# Patient Record
Sex: FEMALE | Race: BLACK OR AFRICAN AMERICAN | NOT HISPANIC OR LATINO | URBAN - METROPOLITAN AREA
[De-identification: names, ages, dates, MRNs, and addresses within clinical notes are randomized per-mention and may not be internally consistent; named-entity substitution may affect disease eponyms.]

---

## 2021-01-13 ENCOUNTER — IMMUNIZATION (OUTPATIENT)
Dept: INTERNAL MEDICINE | Facility: CLINIC | Age: 81
End: 2021-01-13
Payer: OTHER GOVERNMENT

## 2021-01-13 DIAGNOSIS — Z23 NEED FOR VACCINATION: ICD-10-CM

## 2021-01-13 PROCEDURE — 91300 COVID-19, MRNA, LNP-S, PF, 30 MCG/0.3 ML DOSE VACCINE: CPT | Mod: PBBFAC

## 2021-02-03 ENCOUNTER — IMMUNIZATION (OUTPATIENT)
Dept: INTERNAL MEDICINE | Facility: CLINIC | Age: 81
End: 2021-02-03
Payer: MEDICARE

## 2021-02-03 DIAGNOSIS — Z23 NEED FOR VACCINATION: Primary | ICD-10-CM

## 2021-02-03 PROCEDURE — 91300 COVID-19, MRNA, LNP-S, PF, 30 MCG/0.3 ML DOSE VACCINE: CPT | Mod: PBBFAC | Performed by: INTERNAL MEDICINE

## 2021-02-03 PROCEDURE — 0002A COVID-19, MRNA, LNP-S, PF, 30 MCG/0.3 ML DOSE VACCINE: CPT | Mod: PBBFAC | Performed by: INTERNAL MEDICINE

## 2021-09-30 ENCOUNTER — IMMUNIZATION (OUTPATIENT)
Dept: INTERNAL MEDICINE | Facility: CLINIC | Age: 81
End: 2021-09-30
Payer: MEDICARE

## 2021-09-30 DIAGNOSIS — Z23 NEED FOR VACCINATION: Primary | ICD-10-CM

## 2021-09-30 PROCEDURE — 91300 COVID-19, MRNA, LNP-S, PF, 30 MCG/0.3 ML DOSE VACCINE: CPT | Mod: PBBFAC

## 2021-09-30 PROCEDURE — 0003A COVID-19, MRNA, LNP-S, PF, 30 MCG/0.3 ML DOSE VACCINE: CPT | Mod: CV19,PBBFAC | Performed by: INTERNAL MEDICINE

## 2025-01-01 ENCOUNTER — OFFICE VISIT (OUTPATIENT)
Dept: URGENT CARE | Facility: CLINIC | Age: 85
End: 2025-01-01
Payer: MEDICARE

## 2025-01-01 VITALS
TEMPERATURE: 98 F | OXYGEN SATURATION: 97 % | WEIGHT: 180.75 LBS | BODY MASS INDEX: 27.39 KG/M2 | HEART RATE: 91 BPM | HEIGHT: 68 IN | DIASTOLIC BLOOD PRESSURE: 72 MMHG | RESPIRATION RATE: 20 BRPM | SYSTOLIC BLOOD PRESSURE: 178 MMHG

## 2025-01-01 DIAGNOSIS — M25.531 RIGHT WRIST PAIN: ICD-10-CM
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
## 2025-01-01 DIAGNOSIS — S52.571A OTHER CLOSED INTRA-ARTICULAR FRACTURE OF DISTAL END OF RIGHT RADIUS, INITIAL ENCOUNTER: Primary | ICD-10-CM

## 2025-01-01 DIAGNOSIS — M79.641 RIGHT HAND PAIN: ICD-10-CM

## 2025-01-01 DIAGNOSIS — S09.90XA HEAD TRAUMA, INITIAL ENCOUNTER: ICD-10-CM

## 2025-01-01 DIAGNOSIS — S52.601A CLOSED FRACTURE OF DISTAL END OF RIGHT ULNA, UNSPECIFIED FRACTURE MORPHOLOGY, INITIAL ENCOUNTER: ICD-10-CM

## 2025-01-01 PROCEDURE — 73110 X-RAY EXAM OF WRIST: CPT | Mod: RT,S$GLB,, | Performed by: RADIOLOGY

## 2025-01-01 PROCEDURE — 73130 X-RAY EXAM OF HAND: CPT | Mod: RT,S$GLB,, | Performed by: RADIOLOGY

## 2025-01-01 RX ORDER — OLMESARTAN MEDOXOMIL 40 MG/1
40 TABLET ORAL
COMMUNITY
Start: 2024-12-09

## 2025-01-01 RX ORDER — TRAMADOL HYDROCHLORIDE 50 MG/1
50 TABLET ORAL EVERY 8 HOURS PRN
Qty: 15 EACH | Refills: 0 | Status: SHIPPED | OUTPATIENT
Start: 2025-01-01 | End: 2025-01-02 | Stop reason: ALTCHOICE

## 2025-01-01 RX ORDER — LORAZEPAM 1 MG/1
1 TABLET ORAL 2 TIMES DAILY PRN
COMMUNITY
Start: 2024-10-03

## 2025-01-01 RX ORDER — ESCITALOPRAM OXALATE 10 MG/1
10 TABLET ORAL
COMMUNITY
Start: 2024-08-29

## 2025-01-02 ENCOUNTER — OFFICE VISIT (OUTPATIENT)
Dept: ORTHOPEDICS | Facility: CLINIC | Age: 85
End: 2025-01-02
Payer: MEDICARE

## 2025-01-02 ENCOUNTER — TELEPHONE (OUTPATIENT)
Dept: ORTHOPEDICS | Facility: CLINIC | Age: 85
End: 2025-01-02
Payer: MEDICARE

## 2025-01-02 ENCOUNTER — HOSPITAL ENCOUNTER (OUTPATIENT)
Dept: RADIOLOGY | Facility: OTHER | Age: 85
Discharge: HOME | End: 2025-01-02
Attending: ORTHOPAEDIC SURGERY
Payer: MEDICARE

## 2025-01-02 DIAGNOSIS — S62.101A CLOSED FRACTURE OF RIGHT WRIST, INITIAL ENCOUNTER: Primary | ICD-10-CM

## 2025-01-02 DIAGNOSIS — S52.501A CLOSED FRACTURE OF DISTAL END OF RIGHT RADIUS, UNSPECIFIED FRACTURE MORPHOLOGY, INITIAL ENCOUNTER: Primary | ICD-10-CM

## 2025-01-02 DIAGNOSIS — S62.101A CLOSED FRACTURE OF RIGHT WRIST, INITIAL ENCOUNTER: ICD-10-CM

## 2025-01-02 PROCEDURE — 1159F MED LIST DOCD IN RCRD: CPT | Mod: CPTII,S$GLB,, | Performed by: SPECIALIST/TECHNOLOGIST

## 2025-01-02 PROCEDURE — 1101F PT FALLS ASSESS-DOCD LE1/YR: CPT | Mod: CPTII,S$GLB,, | Performed by: SPECIALIST/TECHNOLOGIST

## 2025-01-02 PROCEDURE — 73110 X-RAY EXAM OF WRIST: CPT | Mod: 26,RT,, | Performed by: RADIOLOGY

## 2025-01-02 PROCEDURE — 99999 PR PBB SHADOW E&M-EST. PATIENT-LVL III: CPT | Mod: PBBFAC,,, | Performed by: SPECIALIST/TECHNOLOGIST

## 2025-01-02 PROCEDURE — 1125F AMNT PAIN NOTED PAIN PRSNT: CPT | Mod: CPTII,S$GLB,, | Performed by: SPECIALIST/TECHNOLOGIST

## 2025-01-02 PROCEDURE — 73110 X-RAY EXAM OF WRIST: CPT | Mod: TC,FY,RT

## 2025-01-02 PROCEDURE — 99204 OFFICE O/P NEW MOD 45 MIN: CPT | Mod: S$GLB,,, | Performed by: SPECIALIST/TECHNOLOGIST

## 2025-01-02 PROCEDURE — 3288F FALL RISK ASSESSMENT DOCD: CPT | Mod: CPTII,S$GLB,, | Performed by: SPECIALIST/TECHNOLOGIST

## 2025-01-02 RX ORDER — ONDANSETRON 4 MG/1
4 TABLET, FILM COATED ORAL EVERY 8 HOURS PRN
Qty: 10 TABLET | Refills: 0 | Status: SHIPPED | OUTPATIENT
Start: 2025-01-02

## 2025-01-02 RX ORDER — ACETAMINOPHEN 500 MG
1000 TABLET ORAL 2 TIMES DAILY PRN
Qty: 50 TABLET | Refills: 0 | Status: SHIPPED | OUTPATIENT
Start: 2025-01-02

## 2025-01-02 RX ORDER — OXYCODONE AND ACETAMINOPHEN 5; 325 MG/1; MG/1
1 TABLET ORAL
Qty: 10 TABLET | Refills: 0 | Status: SHIPPED | OUTPATIENT
Start: 2025-01-02

## 2025-01-02 RX ORDER — IBUPROFEN 600 MG/1
600 TABLET ORAL 3 TIMES DAILY PRN
Qty: 45 TABLET | Refills: 0 | Status: SHIPPED | OUTPATIENT
Start: 2025-01-02

## 2025-01-02 NOTE — PROGRESS NOTES
"Subjective:      Patient ID: Morelia Swan is a 84 y.o. female.    Vitals:  height is 5' 8" (1.727 m) and weight is 82 kg (180 lb 12.4 oz). Her oral temperature is 98.3 °F (36.8 °C). Her blood pressure is 178/72 (abnormal) and her pulse is 91. Her respiration is 20 and oxygen saturation is 97%.     Chief Complaint: Fall    Pt is an 83 yo female who states she had a fall last night and tried to stop herself from falling injuring her right hand and her right wrist. She took tylenol for pain with relief. She applied some ice last night and was elevating it today with mild relief of swelling. Pt also reports swelling and bruising to the R side of her R eye, states she thinks her glasses hit the wall and caused the injury. Denies LOC, headache, vision changes, nausea, fatigue.     Fall  The accident occurred 12 to 24 hours ago. The fall occurred while standing. She fell from a height of 3 to 5 ft. She landed on Hard floor. There was no blood loss. The point of impact was the right wrist (right hand). The pain is present in the right wrist. The pain is at a severity of 9/10. The pain is moderate. The symptoms are aggravated by movement and use of injured limb. Pertinent negatives include no headaches or numbness. She has tried ice and acetaminophen for the symptoms. The treatment provided mild relief.       Musculoskeletal:  Positive for trauma, joint pain and joint swelling. Negative for abnormal ROM of joint.   Neurological:  Negative for dizziness, passing out, headaches, numbness and tingling.      Objective:     Physical Exam   Constitutional: She is oriented to person, place, and time.   HENT:   Head: Normocephalic. Head is with raccoon's eyes. Head is without abrasion.   Ears:   Right Ear: External ear normal.   Left Ear: External ear normal.   Nose: Nose normal.   Mouth/Throat: Mucous membranes are moist.   Eyes: Conjunctivae are normal.       Cardiovascular: Normal rate.   Pulmonary/Chest: Effort normal. "   Musculoskeletal:      Left wrist: She exhibits tenderness and swelling. She exhibits normal range of motion.      Left hand: She exhibits swelling. She exhibits normal range of motion, no tenderness and normal capillary refill.   Neurological: She is alert, oriented to person, place, and time and at baseline. She has normal motor skills, normal sensation and intact cranial nerves (2-12). She displays no weakness, no atrophy, no tremor and no dysarthria. No cranial nerve deficit. She has a normal Finger-Nose-Finger Test and a normal Tandem Gait Test. Coordination: Romberg sign negative and Rapid alternating movements normal. She displays no seizure activity. Gait and coordination normal. Coordination and gait normal.   Skin: Skin is dry.   Psychiatric: Her behavior is normal.   Nursing note and vitals reviewed.    XR WRIST COMPLETE 3 VIEWS RIGHT    Result Date: 1/1/2025  EXAMINATION: XR WRIST COMPLETE 3 VIEWS RIGHT CLINICAL HISTORY: Pain in right wrist TECHNIQUE: PA, lateral, and oblique views of the right wrist were performed. COMPARISON: None FINDINGS: An intra-articular compression fracture of the distal right radius is noted with mild volar angulation. There is also an incomplete fracture through the ulna styloid. An intra-articular component may also be present.  No fractures of the carpal bones are seen.  No fracture of the right metacarpals are noted..  There is soft tissue swelling and edematous change noted surrounding the right wrist.  There is also a joint effusion.     See above. This report was flagged in Epic as abnormal. Electronically signed by: Hailey Harkins Date:    01/01/2025 Time:    18:28    XR HAND COMPLETE 3 VIEW RIGHT    Result Date: 1/1/2025  EXAMINATION: XR HAND COMPLETE 3 VIEW RIGHT CLINICAL HISTORY: Pain in right hand TECHNIQUE: PA, lateral, and oblique views of the right hand were performed. COMPARISON: None FINDINGS: Compression fracture of the distal radius with an  intra-articular component probable.  There is a 2nd fracture fragment with minimal displacement of the medial edge of the distal radius.  Also suspect a nondisplaced transverse fracture through the distal ulna.  Surrounding soft tissue swelling is noted and edematous changes of the soft tissues.  No fracture of the right hand is seen.  A joint effusion at the wrist is noted.     See findings. Electronically signed by: Hailey Harkins Date:    01/01/2025 Time:    18:22     Assessment:     1. Other closed intra-articular fracture of distal end of right radius, initial encounter    2. Fall, initial encounter    3. Right hand pain    4. Right wrist pain    5. Closed fracture of distal end of right ulna, unspecified fracture morphology, initial encounter    6. Head trauma, initial encounter        Plan:       Other closed intra-articular fracture of distal end of right radius, initial encounter  -     Ambulatory referral/consult to Orthopedics  -     SUGAR TONG SPLINT FOR HOME USE  -     traMADoL (ULTRAM) 50 mg tablet; Take 1 tablet (50 mg total) by mouth every 8 (eight) hours as needed for Pain.  Dispense: 15 each; Refill: 0    Fall, initial encounter    Right hand pain  -     XR HAND COMPLETE 3 VIEW RIGHT; Future; Expected date: 01/01/2025  -     traMADoL (ULTRAM) 50 mg tablet; Take 1 tablet (50 mg total) by mouth every 8 (eight) hours as needed for Pain.  Dispense: 15 each; Refill: 0    Right wrist pain  -     XR WRIST COMPLETE 3 VIEWS RIGHT; Future; Expected date: 01/01/2025  -     traMADoL (ULTRAM) 50 mg tablet; Take 1 tablet (50 mg total) by mouth every 8 (eight) hours as needed for Pain.  Dispense: 15 each; Refill: 0    Closed fracture of distal end of right ulna, unspecified fracture morphology, initial encounter  -     Ambulatory referral/consult to Orthopedics  -     SUGAR TONG SPLINT FOR HOME USE  -     traMADoL (ULTRAM) 50 mg tablet; Take 1 tablet (50 mg total) by mouth every 8 (eight) hours as needed for  Pain.  Dispense: 15 each; Refill: 0    Head trauma, initial encounter  -     CT Head Without Contrast; Future; Expected date: 01/01/2025      Patient Instructions   - You must understand that you have received an Urgent Care treatment only and that you may be released before all of your medical problems are known or treated.   - You, the patient, will arrange for follow up care as instructed.   - If your condition worsens or fails to improve we recommend that you receive another evaluation at the ER immediately or contact your PCP to discuss your concerns.   - You can call [phone] or [phone] to help schedule an appointment with the appropriate provider.    Take OTC ibuprofen 400-800 mg 3 times per day. Max dose 3200 mg from all sources per day. Or Tylenol 500-1000 mg 3 times per day. Max 4000 mg from all sources per day.   Rest and elevate the affected area as much as possible  Apply ice for 15 minutes. Do this 3-4 times per day.

## 2025-01-02 NOTE — PATIENT INSTRUCTIONS
- You must understand that you have received an Urgent Care treatment only and that you may be released before all of your medical problems are known or treated.   - You, the patient, will arrange for follow up care as instructed.   - If your condition worsens or fails to improve we recommend that you receive another evaluation at the ER immediately or contact your PCP to discuss your concerns.   - You can call [phone] or [phone] to help schedule an appointment with the appropriate provider.    Take OTC ibuprofen 400-800 mg 3 times per day. Max dose 3200 mg from all sources per day. Or Tylenol 500-1000 mg 3 times per day. Max 4000 mg from all sources per day.   Rest and elevate the affected area as much as possible  Apply ice for 15 minutes. Do this 3-4 times per day.

## 2025-01-02 NOTE — H&P (VIEW-ONLY)
Patient ID: Morelia Swan is a 84 y.o. female.    Chief Complaint: Pain of the Right Wrist    History of Present Illness    CHIEF COMPLAINT:  - Morelia who is right-handed presents for initial evaluation of a right wrist fracture sustained from a fall.    HPI:  Morelia presents to the clinic following a fall. Her shoes stuck to the floor, causing her to fall and injure her right wrist. Morelia indicates the location of pain. After a cast was applied, she mentions her wrist was painful and itchy. She denies any previous injuries to her wrist and confirms full use of her hand prior to this incident. 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 She reports having blood pressure issues but denies any other health problems.    X-rays reveal a distal radius fracture of her right wrist, which is described as severe and misaligned. She expresses concern about her ability to care for her sick  at home and her ability to continue her volunteer work. She appears hesitant about surgery, worried about the impact on her caregiving responsibilities and her job.    She denies any numbness or tingling in the affected wrist, tenderness in the elbow, or previous wrist injuries.    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          Hand/Wrist Musculoskeletal Exam    Inspection    Right      Erythema: none      Ecchymosis: none      Edema: moderate      Deformity: none    Left      Erythema: none      Ecchymosis: none      Edema: none      Deformity: none    Palpation    Right      Wrist tenderness to palpation: radial styloid, radial carpal joint and ulnocarpal joint    Palpation additional comments: Denies any elbow pain.    Range of Motion        Range of motion  additional comments: Able to make a composite fist.     Neurovascular    Right       Radial pulse: normal      Capillary refill: brisk and <3 sec      Ulnar nerve sensory distribution: normal      Median nerve sensory distribution: normal      Superficial radial nerve sensory distribution: normal    Left       Radial pulse: normal      Capillary refill: brisk and <3 sec      Ulnar nerve sensory distribution: normal      Median nerve sensory distribution: normal      Superficial radial nerve sensory distribution: normal      Physical Exam    MSK: Hand/Wrist - Right: Right wrist is really swollen.  IMAGING:  - X-rays right wrist: Distal radius fracture, severe and misaligned. The bone is crushed and compacted, with multiple pieces (comminuted).           IMAGING  Right wrist XR  Personal interpretation of the XR reveals comminuted distal radius fracture.       PLAN  Assessment & Plan    - Move fingers regularly to maintain mobility.  - Referred to physical therapy for post-operative rehabilitation and to improve wrist mobility.  - Morelia to start therapy at 2 weeks post-surgery.  - No lifting, pushing, or pulling for 2 weeks regardless of surgery or non-surgery option.  - Advised to keep wrist elevated above heart to reduce swelling.  - Instructed to continue moving fingers.  - Recommend surgery for distal radius fracture of the right wrist.  - Explained surgery would realign the bone and put it in place using a plate.  - Risks include scarring, bleeding, infection, and need for more surgery.  - Informed patient surgery would be done under moderate sedation with IV and a nerve block.  - Morelia understands the risks and benefits and elects to proceed with surgery.  - Scheduled outpatient surgery for Monday (exact time to be determined).  - Follow up in 2 weeks for suture removal and to start therapy.  - Assess progress and provide post-operative care at follow-up visit.              Dio Hernandez PA-C, ATC  Hand and Upper  Extremity [facility]    This note was generated with the assistance of ambient listening technology. Verbal consent was obtained by the patient and accompanying visitor(s) for the recording of patient appointment to facilitate this note. I attest to having reviewed and edited the generated note for accuracy, though some syntax or spelling errors may persist. Please contact the author of this note for any clarification.

## 2025-01-02 NOTE — PROGRESS NOTES
Patient ID: Morelia Swan is a 84 y.o. female.    Chief Complaint: Pain of the Right Wrist    History of Present Illness    CHIEF COMPLAINT:  - Morelia who is right-handed presents for initial evaluation of a right wrist fracture sustained from a fall.    HPI:  Morelia presents to the clinic following a fall. Her shoes stuck to the floor, causing her to fall and injure her right wrist. Morelia indicates the location of pain. After a cast was applied, she mentions her wrist was painful and itchy. She denies any previous injuries to her wrist and confirms full use of her hand prior to this incident. 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 She reports having blood pressure issues but denies any other health problems.    X-rays reveal a distal radius fracture of her right wrist, which is described as severe and misaligned. She expresses concern about her ability to care for her sick  at home and her ability to continue her volunteer work. She appears hesitant about surgery, worried about the impact on her caregiving responsibilities and her job.    She denies any numbness or tingling in the affected wrist, tenderness in the elbow, or previous wrist injuries.    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          Hand/Wrist Musculoskeletal Exam    Inspection    Right      Erythema: none      Ecchymosis: none      Edema: moderate      Deformity: none    Left      Erythema: none      Ecchymosis: none      Edema: none      Deformity: none    Palpation    Right      Wrist tenderness to palpation: radial styloid, radial carpal joint and ulnocarpal joint    Palpation additional comments: Denies any elbow pain.    Range of Motion        Range of motion  additional comments: Able to make a composite fist.     Neurovascular    Right       Radial pulse: normal      Capillary refill: brisk and <3 sec      Ulnar nerve sensory distribution: normal      Median nerve sensory distribution: normal      Superficial radial nerve sensory distribution: normal    Left       Radial pulse: normal      Capillary refill: brisk and <3 sec      Ulnar nerve sensory distribution: normal      Median nerve sensory distribution: normal      Superficial radial nerve sensory distribution: normal      Physical Exam    MSK: Hand/Wrist - Right: Right wrist is really swollen.  IMAGING:  - X-rays right wrist: Distal radius fracture, severe and misaligned. The bone is crushed and compacted, with multiple pieces (comminuted).           IMAGING  Right wrist XR  Personal interpretation of the XR reveals comminuted distal radius fracture.       PLAN  Assessment & Plan    - Move fingers regularly to maintain mobility.  - Referred to physical therapy for post-operative rehabilitation and to improve wrist mobility.  - Morelia to start therapy at 2 weeks post-surgery.  - No lifting, pushing, or pulling for 2 weeks regardless of surgery or non-surgery option.  - Advised to keep wrist elevated above heart to reduce swelling.  - Instructed to continue moving fingers.  - Recommend surgery for distal radius fracture of the right wrist.  - Explained surgery would realign the bone and put it in place using a plate.  - Risks include scarring, bleeding, infection, and need for more surgery.  - Informed patient surgery would be done under moderate sedation with IV and a nerve block.  - Morelia understands the risks and benefits and elects to proceed with surgery.  - Scheduled outpatient surgery for Monday (exact time to be determined).  - Follow up in 2 weeks for suture removal and to start therapy.  - Assess progress and provide post-operative care at follow-up visit.              Dio Hernandez PA-C, ATC  Hand and Upper  Extremity [facility]    This note was generated with the assistance of ambient listening technology. Verbal consent was obtained by the patient and accompanying visitor(s) for the recording of patient appointment to facilitate this note. I attest to having reviewed and edited the generated note for accuracy, though some syntax or spelling errors may persist. Please contact the author of this note for any clarification.

## 2025-01-02 NOTE — TELEPHONE ENCOUNTER
Spoke to patient and scheduled her appointment today with new xrays----- Message from Lukas sent at 1/2/2025 10:32 AM CST -----  Regarding: Appt  Contact: pt [phone]  Pt is returning call to schedule appt for dx:   S52.571A (ICD-10-CM) - Other closed intra-articular fracture of distal end of right radius, initial encounter, S52.601A (ICD-10-CM) - Closed fracture of distal end of right ulna, unspecified fracture morphology, initial encounter due to fall, please call pt @ [phone]

## 2025-01-02 NOTE — TELEPHONE ENCOUNTER
----- Message from Ted Benítez sent at 1/2/2025 11:57 AM CST -----    ----- Message -----  From: Priscilla Agarwal  Sent: 1/2/2025  10:42 AM CST  To: [facility]    Name of Caller: Ofe     Nature of Call: returning a missed call    Best Call Back Number: [phone]     Additional Information:  ADALID MCGUIRE daughter Ofe calling regarding a missed call from Barbie about scheduling an appt for a fracture. Please call back to assist

## 2025-01-03 ENCOUNTER — ANESTHESIA EVENT (OUTPATIENT)
Dept: SURGERY | Facility: HOSPITAL | Age: 85
End: 2025-01-03
Payer: MEDICARE

## 2025-01-03 ENCOUNTER — TELEPHONE (OUTPATIENT)
Dept: ORTHOPEDICS | Facility: CLINIC | Age: 85
End: 2025-01-03
Payer: MEDICARE

## 2025-01-03 NOTE — ANESTHESIA PAT ROS NOTE
01/03/2025  Morelia Swan is a 84 y.o., female.      Pre-op Assessment    I have reviewed the Patient Summary Reports.       I have reviewed the Medications.     Review of Systems  Anesthesia Hx:  No previous Anesthesia                Social:  Non-Smoker, No Alcohol Use       Cardiovascular:     Hypertension                                          Musculoskeletal:     Closed fracture of distal end of right radius, unspecified fracture morphology, initial encounter; had a fall.            Psych:   anxiety depression                 No past medical history on file.  No past surgical history on file.    Anesthesia Assessment: Preoperative EQUATION    Planned Procedure: Procedure(s) (LRB):  ORIF, FRACTURE, RADIUS, DISTAL (Right)  Requested Anesthesia Type:Regional  Surgeon: Aneta Bill MD  Service: Orthopedics  Known or anticipated Date of Surgery:1/6/2025    Surgeon notes: reviewed    Electronic QUestionnaire Assessment completed via nurse interview with patient.      Triage considerations:     The patient has no apparent active cardiac condition (No unstable coronary Syndrome such as severe unstable angina or recent [<1 month] myocardial infarction, decompensated CHF, severe valvular   disease or significant arrhythmia)    Previous anesthesia records:None    Last PCP note:  no PCP note in chart  Subspecialty notes: Ortho    Other important co-morbidities: HTN   No EKG/Echo/Stress test noted on chart     Tests already available:  Results have been reviewed.             Instructions given. (See in Nurse's note) Pt instructed in pre op medications verbally via phone [phone].    Optimization:  Anesthesia Preop Clinic Assessment not Indicated    Medical Opinion Indicated: no       Sub-specialist consult indicated: no      Plan:   No pre op medical clearance requested.    Navigation: Straight Line to  "surgery.               No tests, anesthesia preop clinic visit, or consult required.    Ht: 5'8"  Wt: 82 kg (180 lb)  BMI: 27.49  "

## 2025-01-03 NOTE — TELEPHONE ENCOUNTER
Spoke c pt. Informed pt of a  9:00 a.m. arrival time for surgery at the [facility]. Confirmed no scratches or open wounds on their hands. Reminded pt of NPO status & PO appt. Pt expressed understanding & was thankful.

## 2025-01-05 NOTE — ANESTHESIA PREPROCEDURE EVALUATION
"                                                                                                             01/05/2025  Morelia Swan is a 84 y.o., female.    Pre-operative evaluation for Procedure(s) (LRB):  ORIF, FRACTURE, RADIUS, DISTAL (Right)    Morelia Swan is a 84 y.o. female     Patient Active Problem List   Diagnosis    Closed fracture of right distal radius       Review of patient's allergies indicates:   Allergen Reactions    Penicillin Hives       No current facility-administered medications on file prior to encounter.     Current Outpatient Medications on File Prior to Encounter   Medication Sig Dispense Refill    acetaminophen (TYLENOL) 500 MG tablet Take 2 tablets (1,000 mg total) by mouth 2 (two) times daily as needed for Pain. Begin post op day 3. 50 tablet 0    LORazepam (ATIVAN) 1 MG tablet Take 1 mg by mouth 2 (two) times daily as needed.      olmesartan (BENICAR) 40 MG tablet Take 40 mg by mouth.      EScitalopram oxalate (LEXAPRO) 10 MG tablet Take 10 mg by mouth.      ibuprofen (ADVIL,MOTRIN) 600 MG tablet Take 1 tablet (600 mg total) by mouth 3 (three) times daily as needed for Pain. Bedside Delivery 45 tablet 0    ondansetron (ZOFRAN) 4 MG tablet Take 1 tablet (4 mg total) by mouth every 8 (eight) hours as needed for Nausea. 10 tablet 0    oxyCODONE-acetaminophen (PERCOCET) 5-325 mg per tablet Take 1 tablet by mouth every 4 to 6 hours as needed for Pain ((moderate-severe)). PO day 1-2 10 tablet 0       History reviewed. No pertinent surgical history.      CBC:  No results found for: "WBC", "RBC", "HGB", "HCT", "PLT", "MCV", "MCH", "MCHC"    CMP:   No results found for: "NA", "K", "CL", "CO2", "BUN", "CREATININE", "GLU", "MG", "PHOS", "CALCIUM", "ALBUMIN", "PROT", "ALKPHOS", "ALT", "AST", "BILITOT"    INR:  No results found for: "PT", "INR", "PROTIME", "APTT"      Diagnostic Studies:      Pre-op Vitals [01/06/25 0820]   BP Pulse Resp Temp SpO2   (!) 196/93 105 20 36.6 °C (97.9 °F) 98 %    " "  Height Weight BMI (Calculated)     5' 8" 180 lb 27.4            Pre-op Assessment    I have reviewed the Patient Summary Reports.       I have reviewed the Medications.     Review of Systems  Anesthesia Hx:  No previous Anesthesia                Social:  Non-Smoker, No Alcohol Use       Cardiovascular:     Hypertension                                          Musculoskeletal:     Closed fracture of distal end of right radius, unspecified fracture morphology, initial encounter; had a fall.            Psych:   anxiety depression                Physical Exam  General: Well nourished and Cooperative    Airway:  Mallampati: I   TM Distance: Normal  Tongue: Normal  Neck ROM: Normal ROM    Dental:  Dentures    Chest/Lungs:  Normal Respiratory Rate      No past medical history on file.  No past surgical history on file.    Anesthesia Assessment: Preoperative EQUATION    Planned Procedure: Procedure(s) (LRB):  ORIF, FRACTURE, RADIUS, DISTAL (Right)  Requested Anesthesia Type:Regional  Surgeon: Aneta Bill MD  Service: Orthopedics  Known or anticipated Date of Surgery:1/6/2025    Surgeon notes: reviewed    Electronic QUestionnaire Assessment completed via nurse interview with patient.      Triage considerations:     The patient has no apparent active cardiac condition (No unstable coronary Syndrome such as severe unstable angina or recent [<1 month] myocardial infarction, decompensated CHF, severe valvular   disease or significant arrhythmia)    Previous anesthesia records:None    Last PCP note:  no PCP note in chart  Subspecialty notes: Ortho    Other important co-morbidities: HTN   No EKG/Echo/Stress test noted on chart     Tests already available:  Results have been reviewed.             Instructions given. (See in Nurse's note) Pt instructed in pre op medications verbally via phone [phone].    Optimization:  Anesthesia Preop Clinic Assessment not Indicated    Medical Opinion Indicated: no       " "Sub-specialist consult indicated: no      Plan:   No pre op medical clearance requested.    Navigation: Straight Line to surgery.               No tests, anesthesia preop clinic visit, or consult required.    Ht: 5'8"  Wt: 82 kg (180 lb)  BMI: 27.49    Anesthesia Plan  Type of Anesthesia, risks & benefits discussed:    Anesthesia Type: Regional, Gen Natural Airway, Gen ETT, Gen Supraglottic Airway  Intra-op Monitoring Plan: Standard ASA Monitors  Post Op Pain Control Plan: peripheral nerve block, multimodal analgesia and IV/PO Opioids PRN  Induction:  IV  Informed Consent: Informed consent signed with the Patient and all parties understand the risks and agree with anesthesia plan.  All questions answered.   ASA Score: 2    Ready For Surgery From Anesthesia Perspective.     .  "

## 2025-01-06 ENCOUNTER — HOSPITAL ENCOUNTER (OUTPATIENT)
Facility: HOSPITAL | Age: 85
Discharge: HOME | End: 2025-01-06
Attending: ORTHOPAEDIC SURGERY | Admitting: ORTHOPAEDIC SURGERY
Payer: MEDICARE

## 2025-01-06 ENCOUNTER — ANESTHESIA (OUTPATIENT)
Dept: SURGERY | Facility: HOSPITAL | Age: 85
End: 2025-01-06
Payer: MEDICARE

## 2025-01-06 VITALS
WEIGHT: 180 LBS | HEIGHT: 68 IN | OXYGEN SATURATION: 96 % | RESPIRATION RATE: 22 BRPM | SYSTOLIC BLOOD PRESSURE: 171 MMHG | HEART RATE: 94 BPM | BODY MASS INDEX: 27.28 KG/M2 | DIASTOLIC BLOOD PRESSURE: 76 MMHG | TEMPERATURE: 98 F

## 2025-01-06 DIAGNOSIS — S52.501A CLOSED FRACTURE OF RIGHT DISTAL RADIUS: Primary | ICD-10-CM

## 2025-01-06 PROCEDURE — 63600175 PHARM REV CODE 636 W HCPCS

## 2025-01-06 PROCEDURE — 71000015 HC POSTOP RECOV 1ST HR: Performed by: ORTHOPAEDIC SURGERY

## 2025-01-06 PROCEDURE — 25000003 PHARM REV CODE 250: Performed by: ORTHOPAEDIC SURGERY

## 2025-01-06 PROCEDURE — 36000711: Performed by: ORTHOPAEDIC SURGERY

## 2025-01-06 PROCEDURE — C1769 GUIDE WIRE: HCPCS | Performed by: ORTHOPAEDIC SURGERY

## 2025-01-06 PROCEDURE — 99900035 HC TECH TIME PER 15 MIN (STAT)

## 2025-01-06 PROCEDURE — 71000033 HC RECOVERY, INTIAL HOUR: Performed by: ORTHOPAEDIC SURGERY

## 2025-01-06 PROCEDURE — 37000008 HC ANESTHESIA 1ST 15 MINUTES: Performed by: ORTHOPAEDIC SURGERY

## 2025-01-06 PROCEDURE — C1713 ANCHOR/SCREW BN/BN,TIS/BN: HCPCS | Performed by: ORTHOPAEDIC SURGERY

## 2025-01-06 PROCEDURE — 27201423 OPTIME MED/SURG SUP & DEVICES STERILE SUPPLY: Performed by: ORTHOPAEDIC SURGERY

## 2025-01-06 PROCEDURE — 63600175 PHARM REV CODE 636 W HCPCS: Performed by: ANESTHESIOLOGY

## 2025-01-06 PROCEDURE — 94761 N-INVAS EAR/PLS OXIMETRY MLT: CPT

## 2025-01-06 PROCEDURE — 25000003 PHARM REV CODE 250: Performed by: ANESTHESIOLOGY

## 2025-01-06 PROCEDURE — 37000009 HC ANESTHESIA EA ADD 15 MINS: Performed by: ORTHOPAEDIC SURGERY

## 2025-01-06 PROCEDURE — 25609 OPTX DST RD XART FX/EP SEP3+: CPT | Mod: RT,,, | Performed by: ORTHOPAEDIC SURGERY

## 2025-01-06 PROCEDURE — 36000710: Performed by: ORTHOPAEDIC SURGERY

## 2025-01-06 PROCEDURE — 25000003 PHARM REV CODE 250: Performed by: NURSE ANESTHETIST, CERTIFIED REGISTERED

## 2025-01-06 PROCEDURE — 63600175 PHARM REV CODE 636 W HCPCS: Performed by: NURSE ANESTHETIST, CERTIFIED REGISTERED

## 2025-01-06 PROCEDURE — 64416 NJX AA&/STRD BRCH PL NFS IMG: CPT | Performed by: ANESTHESIOLOGY

## 2025-01-06 DEVICE — SCREW BNE LOK HEXLB 3.5X14: Type: IMPLANTABLE DEVICE | Site: WRIST | Status: FUNCTIONAL

## 2025-01-06 DEVICE — SCREW BONE 2.3 X 18MM: Type: IMPLANTABLE DEVICE | Site: WRIST | Status: FUNCTIONAL

## 2025-01-06 DEVICE — SCREW BNE N LOK HEXLB 3.5X12: Type: IMPLANTABLE DEVICE | Site: WRIST | Status: FUNCTIONAL

## 2025-01-06 DEVICE — PLATE VDR NARROW RIGHT: Type: IMPLANTABLE DEVICE | Site: WRIST | Status: FUNCTIONAL

## 2025-01-06 DEVICE — PEG LOCKING CORITCAL 2.3X12MM: Type: IMPLANTABLE DEVICE | Site: WRIST | Status: FUNCTIONAL

## 2025-01-06 DEVICE — SCREW BNE LOK HEXLB 3.5X12: Type: IMPLANTABLE DEVICE | Site: WRIST | Status: FUNCTIONAL

## 2025-01-06 DEVICE — SCREW BONE 2.3 X 24MM: Type: IMPLANTABLE DEVICE | Site: WRIST | Status: FUNCTIONAL

## 2025-01-06 DEVICE — SCREW BONE LOK CONG 2.3X20MM: Type: IMPLANTABLE DEVICE | Site: WRIST | Status: FUNCTIONAL

## 2025-01-06 RX ORDER — FENTANYL CITRATE 50 UG/ML
25-200 INJECTION, SOLUTION INTRAMUSCULAR; INTRAVENOUS
Status: DISCONTINUED | OUTPATIENT
Start: 2025-01-06 | End: 2025-01-06 | Stop reason: HOSPADM

## 2025-01-06 RX ORDER — PROPOFOL 10 MG/ML
VIAL (ML) INTRAVENOUS CONTINUOUS PRN
Status: DISCONTINUED | OUTPATIENT
Start: 2025-01-06 | End: 2025-01-06

## 2025-01-06 RX ORDER — PROPOFOL 10 MG/ML
VIAL (ML) INTRAVENOUS
Status: DISCONTINUED | OUTPATIENT
Start: 2025-01-06 | End: 2025-01-06

## 2025-01-06 RX ORDER — CLINDAMYCIN PHOSPHATE 900 MG/50ML
900 INJECTION, SOLUTION INTRAVENOUS
Status: COMPLETED | OUTPATIENT
Start: 2025-01-06 | End: 2025-01-06

## 2025-01-06 RX ORDER — ROPIVACAINE HYDROCHLORIDE 2 MG/ML
INJECTION, SOLUTION EPIDURAL; INFILTRATION; PERINEURAL CONTINUOUS
Status: DISCONTINUED | OUTPATIENT
Start: 2025-01-06 | End: 2025-01-06 | Stop reason: HOSPADM

## 2025-01-06 RX ORDER — MIDAZOLAM HYDROCHLORIDE 1 MG/ML
.5-4 INJECTION, SOLUTION INTRAMUSCULAR; INTRAVENOUS
Status: DISCONTINUED | OUTPATIENT
Start: 2025-01-06 | End: 2025-01-06 | Stop reason: HOSPADM

## 2025-01-06 RX ORDER — FAMOTIDINE 10 MG/ML
INJECTION INTRAVENOUS
Status: DISCONTINUED | OUTPATIENT
Start: 2025-01-06 | End: 2025-01-06

## 2025-01-06 RX ORDER — SODIUM CHLORIDE 0.9 % (FLUSH) 0.9 %
3 SYRINGE (ML) INJECTION
Status: DISCONTINUED | OUTPATIENT
Start: 2025-01-06 | End: 2025-01-06 | Stop reason: HOSPADM

## 2025-01-06 RX ORDER — LIDOCAINE HYDROCHLORIDE 20 MG/ML
INJECTION INTRAVENOUS
Status: DISCONTINUED | OUTPATIENT
Start: 2025-01-06 | End: 2025-01-06

## 2025-01-06 RX ORDER — ONDANSETRON HYDROCHLORIDE 2 MG/ML
INJECTION, SOLUTION INTRAVENOUS
Status: DISCONTINUED | OUTPATIENT
Start: 2025-01-06 | End: 2025-01-06

## 2025-01-06 RX ORDER — ROPIVACAINE HYDROCHLORIDE 5 MG/ML
INJECTION, SOLUTION EPIDURAL; INFILTRATION; PERINEURAL
Status: COMPLETED | OUTPATIENT
Start: 2025-01-06 | End: 2025-01-06

## 2025-01-06 RX ORDER — HALOPERIDOL 5 MG/ML
0.5 INJECTION INTRAMUSCULAR EVERY 10 MIN PRN
Status: DISCONTINUED | OUTPATIENT
Start: 2025-01-06 | End: 2025-01-06 | Stop reason: HOSPADM

## 2025-01-06 RX ORDER — ACETAMINOPHEN 500 MG
1000 TABLET ORAL
Status: COMPLETED | OUTPATIENT
Start: 2025-01-06 | End: 2025-01-06

## 2025-01-06 RX ORDER — BACITRACIN ZINC 500 UNIT/G
OINTMENT (GRAM) TOPICAL
Status: DISCONTINUED | OUTPATIENT
Start: 2025-01-06 | End: 2025-01-06 | Stop reason: HOSPADM

## 2025-01-06 RX ORDER — CELECOXIB 200 MG/1
400 CAPSULE ORAL
Status: DISCONTINUED | OUTPATIENT
Start: 2025-01-06 | End: 2025-01-06

## 2025-01-06 RX ADMIN — FAMOTIDINE 20 MG: 10 INJECTION, SOLUTION INTRAVENOUS at 09:01

## 2025-01-06 RX ADMIN — PROPOFOL 100 MCG/KG/MIN: 10 INJECTION, EMULSION INTRAVENOUS at 09:01

## 2025-01-06 RX ADMIN — ACETAMINOPHEN 1000 MG: 500 TABLET ORAL at 08:01

## 2025-01-06 RX ADMIN — ONDANSETRON 4 MG: 2 INJECTION INTRAMUSCULAR; INTRAVENOUS at 09:01

## 2025-01-06 RX ADMIN — LIDOCAINE HYDROCHLORIDE 50 MG: 20 INJECTION INTRAVENOUS at 09:01

## 2025-01-06 RX ADMIN — CLINDAMYCIN IN 5 PERCENT DEXTROSE 900 MG: 18 INJECTION, SOLUTION INTRAVENOUS at 09:01

## 2025-01-06 RX ADMIN — FENTANYL CITRATE 50 MCG: 50 INJECTION INTRAMUSCULAR; INTRAVENOUS at 09:01

## 2025-01-06 RX ADMIN — ROPIVACAINE HYDROCHLORIDE 30 ML: 5 INJECTION EPIDURAL; INFILTRATION; PERINEURAL at 09:01

## 2025-01-06 RX ADMIN — MIDAZOLAM 1 MG: 1 INJECTION INTRAMUSCULAR; INTRAVENOUS at 09:01

## 2025-01-06 RX ADMIN — PROPOFOL 50 MG: 10 INJECTION, EMULSION INTRAVENOUS at 09:01

## 2025-01-06 RX ADMIN — Medication: at 11:01

## 2025-01-06 NOTE — ANESTHESIA POSTPROCEDURE EVALUATION
Anesthesia Post Evaluation    Patient: Morelia Swan    Procedure(s) Performed: Procedure(s) (LRB):  ORIF, FRACTURE, RADIUS, DISTAL (Right)    Final Anesthesia Type: general      Patient location during evaluation: PACU  Patient participation: Yes- Able to Participate  Level of consciousness: awake and alert  Post-procedure vital signs: reviewed and stable  Pain management: adequate  Airway patency: patent  LACEY mitigation strategies: Multimodal analgesia  PONV status at discharge: No PONV  Anesthetic complications: no      Cardiovascular status: blood pressure returned to baseline  Respiratory status: unassisted  Hydration status: euvolemic  Follow-up not needed.              Vitals Value Taken Time   /73 01/06/25 1116   Temp 98.2 01/06/25 1124   Pulse 99 01/06/25 1124   Resp 24 01/06/25 1124   SpO2 95 % 01/06/25 1124   Vitals shown include unfiled device data.      No case tracking events are documented in the log.      Pain/Mike Score: Pain Rating Prior to Med Admin: 0 (1/6/2025 11:18 AM)  Pain Rating Post Med Admin: 0 (1/6/2025  9:30 AM)  Mike Score: 8 (1/6/2025 11:05 AM)

## 2025-01-06 NOTE — PLAN OF CARE
Pre Op complete with no questions at this time.  Belongings locked in locker.  Call light in reach.

## 2025-01-06 NOTE — PLAN OF CARE
Patient is AAO and VSS.  Tolerating PO and states pain is tolerable.  Dressing CDI.  Patient states they are ready for d/c.  IV removed.  Catheter tip intact.  Caregiver at bedside.  Discharge instructions reviewed and copy given to the patient and caregiver.  Questions answered.  Both verbalized understanding.  Paper rx given Medication delivered to bedside.  Patient wheeled to car by RN.

## 2025-01-06 NOTE — BRIEF OP NOTE
[place] - Surgery ([facility])  Brief Operative Note    Surgery Date: 1/6/2025     Surgeons and Role:     * Aneta Bill MD - Primary    Assisting Surgeon: None    Pre-op Diagnosis:  Closed fracture of distal end of right radius, unspecified fracture morphology, initial encounter [S52.501A]    Post-op Diagnosis:  Post-Op Diagnosis Codes:     * Closed fracture of distal end of right radius, unspecified fracture morphology, initial encounter [S52.501A]    Procedure(s) (LRB):  ORIF, FRACTURE, RADIUS, DISTAL (Right)    Anesthesia: Regional    Operative Findings: Improved alignment and stability after open reduction and internal fixation. Stable DRUJ at the conclusion of the case.    Estimated Blood Loss: 5 mL         Specimens:   Specimen (24h ago, onward)      None              Discharge Note    OUTCOME: Patient tolerated treatment/procedure well without complication and is now ready for discharge.    DISPOSITION: Home or Self Care    FINAL DIAGNOSIS:  Closed fracture of right distal radius    FOLLOWUP: In clinic    DISCHARGE INSTRUCTIONS:    Discharge Procedure Orders   Diet general     Call MD for:  temperature >100.4     Call MD for:  persistent nausea and vomiting     Call MD for:  severe uncontrolled pain     Call MD for:  difficulty breathing, headache or visual disturbances     Call MD for:  redness, tenderness, or signs of infection (pain, swelling, redness, odor or green/yellow discharge around incision site)     Call MD for:  hives     Call MD for:  persistent dizziness or light-headedness     Call MD for:  extreme fatigue     Leave dressing on - Keep it clean, dry, and intact until clinic visit     Non weight bearing

## 2025-01-06 NOTE — INTERVAL H&P NOTE
The patient has been examined and the H&P has been reviewed:    I concur with the findings and no changes have occurred since H&P was written.    Surgery risks, benefits and alternative options discussed and understood by patient/family.          Active Hospital Problems    Diagnosis  POA    *Closed fracture of right distal radius [G63.046B]  Yes      Resolved Hospital Problems   No resolved problems to display.

## 2025-01-06 NOTE — ANESTHESIA PROCEDURE NOTES
Supraclavicular catheter    Patient location during procedure: pre-op   Block not for primary anesthetic.  Reason for block: at surgeon's request and post-op pain management   Post-op Pain Location: right arm pain   Start time: 1/6/2025 9:05 AM  Timeout: 1/6/2025 9:03 AM   End time: 1/6/2025 9:15 AM    Staffing  Authorizing Provider: Adrianna Nagel MD  Performing Provider: Adrianna Nagel MD    Staffing  Performed by: Adrianna Nagel MD  Authorized by: Adrianna Nagel MD    Preanesthetic Checklist  Completed: patient identified, IV checked, site marked, risks and benefits discussed, surgical consent, monitors and equipment checked, pre-op evaluation and timeout performed  Peripheral Block  Patient position: sitting  Prep: ChloraPrep and site prepped and draped  Patient monitoring: heart rate, cardiac monitor, continuous pulse ox, continuous capnometry and frequent blood pressure checks  Block type: supraclavicular  Laterality: right  Injection technique: continuous  Needle  Needle type: Tuohy   Needle gauge: 17 G  Needle length: 3.5 in  Needle localization: anatomical landmarks and ultrasound guidance  Catheter type: spring wound  Catheter size: 19 G  Test dose: lidocaine 1.5% with Epi 1-to-200,000 and negative   -ultrasound image captured on disc.  Assessment  Injection assessment: negative aspiration, negative parasthesia and local visualized surrounding nerve  Paresthesia pain: none  Heart rate change: no  Slow fractionated injection: yes  Pain Tolerance: comfortable throughout block  Medications:    Medications: ropivacaine (NAROPIN) injection 0.5% - Perineural   30 mL - 1/6/2025 9:10:00 AM    Additional Notes  VSS.  [facility] RN monitoring vitals throughout procedure.  Patient tolerated procedure well.

## 2025-01-06 NOTE — TRANSFER OF CARE
"Anesthesia Transfer of Care Note    Patient: Morelia Swan    Procedure(s) Performed: Procedure(s) (LRB):  ORIF, FRACTURE, RADIUS, DISTAL (Right)    Patient location: PACU    Anesthesia Type: general    Transport from OR: Transported from OR on 100% O2 by closed face mask with adequate spontaneous ventilation    Post pain: adequate analgesia    Post assessment: no apparent anesthetic complications and tolerated procedure well    Post vital signs: stable    Level of consciousness: sedated and responds to stimulation    Nausea/Vomiting: no nausea/vomiting    Complications: none    Transfer of care protocol was followed    Last vitals: Visit Vitals  BP (!) 189/86 (BP Location: Left arm, Patient Position: Lying)   Pulse 103   Temp 36.6 °C (97.9 °F) (Temporal)   Resp 19   Ht 5' 8" (1.727 m)   Wt 81.6 kg (180 lb)   SpO2 100%   Breastfeeding No   BMI 27.37 kg/m²     "

## 2025-01-07 DIAGNOSIS — R52 PAIN: Primary | ICD-10-CM

## 2025-01-07 NOTE — ANESTHESIA POST-OP PAIN MANAGEMENT
"Acute Pain Service Progress Note    Patient contacted regarding CADD infusion follow up. Reports that pain has been well controlled with the infusion pump. Denies signs of local anesthetic toxicity. PNC dressing remains clean, dry, and intact. All questions answered. Reminded patient that the infusion should be discontinued and PNC removed whenever the "infusion complete" alert is seen on the display screen or by POD 5 (  1/11/25   ) at 12pm, whichever comes first. Encouraged patient to call the CADD 24/7 support line at [phone] or the [facility] Anesthesia line at [phone] for any CADD related questions/issues. Verbalizes understanding.          "

## 2025-01-10 NOTE — OP NOTE
[facility] Surgery ([facility])  Surgery Department  Operative Note    SUMMARY     Date of Procedure: 1/6/2025     Procedure: Procedure(s) (LRB):  ORIF, FRACTURE, RADIUS, DISTAL (Right)   Four part intra-articular  Surgeons and Role:     * Aneta Bill MD - Primary    Assisting Surgeon:  Swapnil    Pre-Operative Diagnosis: Closed fracture of distal end of right radius, unspecified fracture morphology, initial encounter [S52.501A]    Post-Operative Diagnosis: Post-Op Diagnosis Codes:     * Closed fracture of distal end of right radius, unspecified fracture morphology, initial encounter [S52.501A]    Anesthesia: Regional    Technical Procedures Used: surgery    Description of the Findings of the Procedure: ANESTHESIA:  Regional MAC.     FLUIDS:  Lactated Ringer's.     BLOOD LOSS:  None.  No blood was given.     TOURNIQUET TIME:  Less than 1 hour.     PACKS AND DRAINS:  None.     IMPLANTS:  Acumed volar plate, narrow, with screws.     COMPLICATIONS:  None.     INDICATIONS FOR PROCEDURE:  Ms miranda is a 84 female that fell and   sustained an intraarticular distal radius fracture.  An attempted   closed reduction was performed in the Emergency Room.  After much discussion   with the patient, we elected for surgical intervention.  Risks and benefits were   explained to the patient in clinic.  Consents were performed in clinic.     PROCEDURE IN DETAIL:  After the correct site was marked with the patient's   participation in the holding area, the patient underwent regional anesthesia.    They were brought to the Operating Room and placed in the supine position.  A   well-padded nonsterile tourniquet was placed on the  upper extremity.    The upper extremity was prepped and draped in a normal sterile fashion.  A   timeout was conducted for the correct site and procedure to be indicated.  IV   antibiotics were given to the patient preoperatively.  An incision was marked on   the volar surface of the right wrist.  The arm  was exsanguinated.  An Esmarch   tourniquet was insufflated to 250 mmHg.  The incision was made with careful   dissection down to the FCR tendon.  The FCR was gently retracted ulnarly.  The   fascia was incised.  Metzenbaum scissors were used to cut the remainder of the   fascia.  The pronator quadratus was elevated up.  Of note, it showed that there   was a significant amount of comminution.  Reduction maneuvers were performed at   this point under C-arm fluoroscopy.  K-wire was introduced from the radial styloid across the fracture site to hold it into position for stability The Acumed volar plate was then placed into   position.  K-wire was introduced distally to hold the volar plate.  A shaft   screw was then drilled and filled appropriately to hold the plate in position.    Again, all of this was performed under C-arm fluoroscopy.  A compression screw   was then drilled distally in the most ulnar aspect of the plate.  The remaining   screws were drilled and filled under locking technique.  The two radial styloid   screws were drilled and filled under C-arm fluoroscopy.  The compression screw   in the ulnar aspect was removed and a locking screw was placed.  The two   remaining shaft screws were drilled and filled appropriately.  AP, lateral, and   lateral oblique views were taken to confirm the articular surface and that the   hardware did not penetrate the articular surface and this was again taken   through a 20-degree view.  The DRUJ was assessed and showed that it was stable.    Full flexion and extension views were also taken to confirm stability of the   fracture and that the hardware did not violate the articular surface.  The area   was then irrigated with copious amounts of normal saline.  Vicryl, Monocryl, and   Dermabond closed the skin.  Sterile dressing was applied.  Tourniquet was   deflated.  Brisk capillary refill ensued.  The patient was placed in a   well-padded splint, tolerated the procedure  well, and was brought to Recovery   Area in stable condition.     POSTOPERATIVE PLAN FOR THIS PATIENT:  Keep the dressing clean, dry, and intact.    We will see her back at two weeks' time.  Therapy is to be initiated at two   weeks' time.  They did have significant swelling preoperatively, and we did   advise the patient on elevating the hand as well as vitamin C.          Significant Surgical Tasks Conducted by the Assistant(s), if Applicable: retraction    Complications: No    Estimated Blood Loss (EBL): * No values recorded between 1/6/2025  9:56 AM and 1/6/2025 10:50 AM *           Implants:   Implant Name Type Inv. Item Serial No.  Lot No. LRB No. Used Action   GUIDEWIRE ORTHO .054 X 6 IN - [id number]  GUIDEWIRE ORTHO .054 X 6 IN  [employer]  Right 2 Implanted and Explanted   PLATE VDR NARROW RIGHT - [id number]  PLATE VDR NARROW RIGHT  [employer]  Right 1 Implanted   SCREW BNE N FIDENCIO HEXLB 3.5X12 - [id number]  SCREW BNE N FIDENCIO HEXLB 3.5X12  [employer]  Right 1 Implanted   SCREW BONE 2.3 X 24MM - [id number]  SCREW BONE 2.3 X 24MM  [employer]  Right 1 Implanted   SCREW BONE FIDENCIO MIRNA 2.3X20MM - [id number]  SCREW BONE FIDENCIO MIRNA 2.3X20MM  [employer]  Right 3 Implanted   SCREW BONE 2.3 X 18MM - [id number]  SCREW BONE 2.3 X 18MM  [employer]  Right 2 Implanted   PEG LOCKING CORITCAL 2.3X12MM - [id number]  PEG LOCKING CORITCAL 2.3X12MM  [employer]  Right 1 Implanted   SCREW BNE FIDENCIO HEXLB 3.5X14 - [id number]  SCREW BNE FIDENCIO HEXLB 3.5X14  [employer]  Right 1 Implanted   SCREW BNE FIDENCIO HEXLB 3.5X12 - [id number]  SCREW BNE FIDENCIO HEXLB 3.5X12  [employer]  Right 1 Implanted       Specimens:   Specimen (24h ago, onward)      None                    Condition: Good    Disposition: PACU - hemodynamically stable.    Attestation: I performed the procedure.    Discharge Note    SUMMARY     Admit Date: 1/6/2025    Discharge Date and Time: 1/6/2025 12:11 PM    Hospital Course (synopsis of major diagnoses, care, treatment, and  services provided during the course of the hospital stay): surgery     Final Diagnosis: Post-Op Diagnosis Codes:     * Closed fracture of distal end of right radius, unspecified fracture morphology, initial encounter [S52.501A]    Disposition: Home or Self Care    Follow Up/Patient Instructions:     Medications:  Reconciled Home Medications:      Medication List        CONTINUE taking these medications      acetaminophen 500 MG tablet  Commonly known as: TYLENOL  Take 2 tablets (1,000 mg total) by mouth 2 (two) times daily as needed for Pain. Begin post op day 3.     EScitalopram oxalate 10 MG tablet  Commonly known as: LEXAPRO  Take 10 mg by mouth.     ibuprofen 600 MG tablet  Commonly known as: ADVIL,MOTRIN  Take 1 tablet (600 mg total) by mouth 3 (three) times daily as needed for Pain. Bedside Delivery     LORazepam 1 MG tablet  Commonly known as: ATIVAN  Take 1 mg by mouth 2 (two) times daily as needed.     olmesartan 40 MG tablet  Commonly known as: BENICAR  Take 40 mg by mouth.     ondansetron 4 MG tablet  Commonly known as: ZOFRAN  Take 1 tablet (4 mg total) by mouth every 8 (eight) hours as needed for Nausea.     oxyCODONE-acetaminophen 5-325 mg per tablet  Commonly known as: PERCOCET  Take 1 tablet by mouth every 4 to 6 hours as needed for Pain ((moderate-severe)). PO day 1-2            Discharge Procedure Orders   Diet general     Call MD for:  temperature >100.4     Call MD for:  persistent nausea and vomiting     Call MD for:  severe uncontrolled pain     Call MD for:  difficulty breathing, headache or visual disturbances     Call MD for:  redness, tenderness, or signs of infection (pain, swelling, redness, odor or green/yellow discharge around incision site)     Call MD for:  hives     Call MD for:  persistent dizziness or light-headedness     Call MD for:  extreme fatigue     Leave dressing on - Keep it clean, dry, and intact until clinic visit     Non weight bearing

## 2025-01-22 ENCOUNTER — TELEPHONE (OUTPATIENT)
Dept: ORTHOPEDICS | Facility: CLINIC | Age: 85
End: 2025-01-22
Payer: MEDICARE

## 2025-01-22 NOTE — TELEPHONE ENCOUNTER
Rescheduled appoitnment----- Message from Cathie sent at 1/22/2025 10:08 AM CST -----  Regarding: appt  Name of Who is Calling:Daughter         What is the request in detail: Inquiring reschedule for Friday if clinics will not be open on tomorrow    Please advise.           Can the clinic reply by [facility]: no         What Number [facility] Back [facility]:Telephone Information:  Mobile          [phone]

## 2025-01-24 ENCOUNTER — HOSPITAL ENCOUNTER (OUTPATIENT)
Dept: RADIOLOGY | Facility: OTHER | Age: 85
Discharge: HOME | End: 2025-01-24
Attending: SPECIALIST/TECHNOLOGIST
Payer: MEDICARE

## 2025-01-24 ENCOUNTER — OFFICE VISIT (OUTPATIENT)
Dept: ORTHOPEDICS | Facility: CLINIC | Age: 85
End: 2025-01-24
Payer: MEDICARE

## 2025-01-24 DIAGNOSIS — S52.509D CLOSED FRACTURE OF DISTAL END OF RADIUS WITH ROUTINE HEALING, UNSPECIFIED FRACTURE MORPHOLOGY, UNSPECIFIED LATERALITY, SUBSEQUENT ENCOUNTER: ICD-10-CM

## 2025-01-24 DIAGNOSIS — Z98.890 POST-OPERATIVE STATE: Primary | ICD-10-CM

## 2025-01-24 DIAGNOSIS — R52 PAIN: ICD-10-CM

## 2025-01-24 DIAGNOSIS — R52 PAIN: Primary | ICD-10-CM

## 2025-01-24 PROCEDURE — 99999 PR PBB SHADOW E&M-EST. PATIENT-LVL III: CPT | Mod: PBBFAC,,, | Performed by: SPECIALIST/TECHNOLOGIST

## 2025-01-24 PROCEDURE — 1101F PT FALLS ASSESS-DOCD LE1/YR: CPT | Mod: CPTII,S$GLB,, | Performed by: SPECIALIST/TECHNOLOGIST

## 2025-01-24 PROCEDURE — 99024 POSTOP FOLLOW-UP VISIT: CPT | Mod: S$GLB,,, | Performed by: SPECIALIST/TECHNOLOGIST

## 2025-01-24 PROCEDURE — 73110 X-RAY EXAM OF WRIST: CPT | Mod: TC,FY,RT

## 2025-01-24 PROCEDURE — 3288F FALL RISK ASSESSMENT DOCD: CPT | Mod: CPTII,S$GLB,, | Performed by: SPECIALIST/TECHNOLOGIST

## 2025-01-24 PROCEDURE — 1159F MED LIST DOCD IN RCRD: CPT | Mod: CPTII,S$GLB,, | Performed by: SPECIALIST/TECHNOLOGIST

## 2025-01-24 PROCEDURE — 1125F AMNT PAIN NOTED PAIN PRSNT: CPT | Mod: CPTII,S$GLB,, | Performed by: SPECIALIST/TECHNOLOGIST

## 2025-01-24 PROCEDURE — 73110 X-RAY EXAM OF WRIST: CPT | Mod: 26,RT,, | Performed by: RADIOLOGY

## 2025-01-24 NOTE — PROGRESS NOTES
Ms. Swan is here today for a post-operative visit.  She is 2 weeks status post right Distal Radius ORIF by Dr. Bill on 1/6/25. She reports that she is moderate pain.  She denies fever, chills, and sweats since the time of the surgery.     Physical exam:    Vitals:    01/24/25 1005   PainSc:   7   PainLoc: Wrist     Vital signs are stable, patient is afebrile.  Patient is well dressed and well groomed, no acute distress.  Alert and oriented to person, place, and time.  Post op dressing taken down.  Incision is clean, dry and intact.  There is no erythema or exudate.  There is no sign of any infection. She is NVI. Sutures removed without difficulty.  She is able to make a composite fist.     Assessment: status post right Distal Radius ORIF    Plan:  Morelia was seen today for post-op evaluation and pain.    Diagnoses and all orders for this visit:    Post-operative state  -     Ambulatory Referral/Consult to Physical Therapy/Occupational Therapy; Future    Closed fracture of distal end of radius with routine healing, unspecified fracture morphology, unspecified laterality, subsequent encounter  -     Ambulatory Referral/Consult to Physical Therapy/Occupational Therapy; Future      PO instruction reviewed and provided to patient  Educated patient on range-of-motion exercises of the finger and wrist.  We will transition patient to a prefabricated wrist brace.    We will refer patient to therapy to begin range-of-motion exercises.  Inform patient no lifting pushing or pulling for 6 weeks postoperatively.  Patient is nonweightbearing through the left wrist for 12 weeks postoperatively.  Patient will follow up in 4 weeks with repeat left wrist x-rays    POST OPERATIVE VISIT INSTRUCTIONS    - no soaking the incision for at least 7-10 days, you may get it wet in the shower and use regular soap and water 1-2 days after suture removal    To avoid a hard, painful scar, we recommend you use Mederma and/or Silicone Scar  patches. You may also use Joyce Butter or Vitamin E oil.    You may start this 1 week after stitches are removed.   - Mederma scar cream : scar massage over incision 1-2 times per day. May use topical lotion or Vitamin E oil for massage an additional few times a day to help the scar become soft and less sensitive  - Silicone Scar Patches: cut and place over the incision, then massage over the patch  to help with scarring and sensitivity. Can be reused up to 4 days if you rinse it clean, let it dry out, then reapply    -Brands:  Mepiform Silicone Scar Sheets (Recommended), Scar Away,    Target Brand or Generic Pharmacy Brand ([facility], [facility], [facility])      - Continue Range of motion exercises as instructed     - May take Tylenol 500 mg and/or Ibuprofen 400mg every 4-6 hours with food for pain as tolerated as long as you do not have an allergy or medical condition that prevents you from taking them    - Therapy recommended: Yes    - Immobilization: Brace Full-time. Wean per thearpy    - Lifting Restrictions: No lifting, pushing and pulling for 6 weeks Post Operatively. No weightbearing through the Right for 12 weeks post operatively.    - Follow up: 4 weeks with repeat XR    -contact us immediately at [phone] if you have any concerns about infection. Please let them know that you are a post operative patient.

## 2025-01-29 ENCOUNTER — CLINICAL SUPPORT (OUTPATIENT)
Dept: REHABILITATION | Facility: OTHER | Age: 85
End: 2025-01-29
Payer: MEDICARE

## 2025-01-29 DIAGNOSIS — M25.631 DECREASED RANGE OF MOTION OF RIGHT WRIST: ICD-10-CM

## 2025-01-29 DIAGNOSIS — S52.509D CLOSED FRACTURE OF DISTAL END OF RADIUS WITH ROUTINE HEALING, UNSPECIFIED FRACTURE MORPHOLOGY, UNSPECIFIED LATERALITY, SUBSEQUENT ENCOUNTER: ICD-10-CM

## 2025-01-29 DIAGNOSIS — M25.531 RIGHT WRIST PAIN: Primary | ICD-10-CM

## 2025-01-29 DIAGNOSIS — Z98.890 POST-OPERATIVE STATE: ICD-10-CM

## 2025-01-29 PROCEDURE — 97166 OT EVAL MOD COMPLEX 45 MIN: CPT | Mod: PN

## 2025-01-29 PROCEDURE — 97110 THERAPEUTIC EXERCISES: CPT | Mod: PN

## 2025-01-29 NOTE — PROGRESS NOTES
[facility] Occupational Therapy  Initial Evaluation     Date: 1/29/2025  Patient: Morelia Swan  Chart Number: [id number]    Therapy Diagnosis:   1. Right wrist pain        2. Post-operative state  Ambulatory Referral/Consult to Physical Therapy/Occupational Therapy      3. Closed fracture of distal end of radius with routine healing, unspecified fracture morphology, unspecified laterality, subsequent encounter  Ambulatory Referral/Consult to Physical Therapy/Occupational Therapy      4. Decreased range of motion of right wrist          Medical Diagnosis: Z98.890 (ICD-10-CM) - Post-operative state S52.509D (ICD-10-CM) - Closed fracture of distal end of radius with routine healing, unspecified fracture morphology, unspecified laterality, subsequent encounter    Referring Physician: Ronny Hernandez PA-C  Physician Orders: Eval and Treat  Note:   Right Distal Radius  DOS: 1/6/25  Date of Return to MD: 2/21/25    Date of Injury: 12/31/24  Date of Surgery: 1/6/25  Right ORIF DRF    Evaluation Date: 1/29/2025  Authorization Period: 1/24/25 - 1/24/26  Plan of Care Expiration: 4/23/25  Visit #/ Visits Authorized: 1 of 1  FOTO Completion: Initial eval (1/29/2025)  FOTO #2:  FOTO #3:    Time In: 1:07 pm  Time Out: 1:47 pm  Total Billable Time: 40 min    Precautions: Standard, strengthening/weightbearing    Subjective     History of Current Condition: Morelia Swan is a 84 y.o. year old Right hand dominant female who is s/p right Distal Radius ORIF by Dr. Bill on 1/6/25. She reports she fell onto her right wrist on 12/31/24 and went to urgent care the next day. X-ray revealed DRF requiring surgical management. She presents today with prefab wrist brace. Morelia Swan is referred to Occupational Therapy for evaluation and treatment. Patient presents today alone.    Falls: onset of injury    Involved Side: Right  Dominant Side: Right    Date of Onset: sx 1/6/25  Imaging: xray  FINDINGS:  ORIF distal radius  fracture.  Alignment improved.  No evidence of hardware failure or loosening.  No marrow replacement process.  Previous Therapy: none    Pain:  Functional Pain Scale Rating 0-10:   Current: 0/10  At Best: 0/10  At Worst: 6/10    Location: Right wrist  Description: shooting  Aggravating Factors: ROM, light use  Easing Factors: rest, wrist brace    Functional Limitations/Social History:  Prior Level of Function: Independent with all ADLs/IADLs    Current Level of Function: Lives with , assistance from 4 daughters who stay with her a week at a time  ADLs: using right hand for light use  IADLs: pain and difficulty with heavy lifting, assistance from daughter for cooking/cleaning/laundry  Leisure: bingo  Driving: Not since the injury    Occupation:   on Aging  Duties: bags lunches, few hours a day    Patient's Goals for Therapy: to regain motion and strength    Past Medical History/Physical Systems Review:   Morelia Swan  has a past medical history of Hypertension.    Morelia Swan  has a past surgical history that includes Open reduction and internal fixation (ORIF) of fracture of distal radius (Right, 1/6/2025).    Morelia has a current medication list which includes the following prescription(s): acetaminophen, escitalopram oxalate, ibuprofen, lorazepam, olmesartan, ondansetron, and oxycodone-acetaminophen.    Review of patient's allergies indicates:   Allergen Reactions    Penicillin Hives        Objective     Mental status: alert, oriented x3    Observation/Appearance:   Moderate swelling throughout wrist and hand  Fully closed incision along volar forearm, thick with fair mobility    Sensation: Patient denies numbness/tingling      Edema:  (Measured circumferential, in centimeters)  Hand/Wrist: Right  1/29/2025 Left  1/29/2025   Wrist Crease 17.5 cm 15.5 cm   DPC 18.3 cm 18.0 cm       ELBOW, WRIST RANGE OF MOTION:   Measured in degrees of active motion with goniometer   Right  1/29/2025 Left  1/29/2025    Elbow Extension/Flexion WNL WNL   Pronation/Supination 80/70 80/80   Wrist Extension/Flexion 35/30 75/50   Ulnar/Radial Deviation 25/15 35/25     Digit AROM:  Measured in degrees of active motion with goniometer and/or distance measured from finger tip to the distal palmar crease (DPC)   Right  1/29/2025 Left  1/29/2025        Long:  MP 0/60 0/90              PIP 0/90 0/110              DIP 0/63 0/70              HDEZ 213 270        Thumb: MP                  IP         Rad ADD/ABD         Pal ADD/ABD         Opposition MP crease of SF DPC       STRENGTH: Not tested due to precautions  (Measured in pounds using a Dynamometer and pinch meter)   Right   Left      Setting 2      Average     Key     3 Pt     Tip         Intake Outcome Measure for FOTO Initial Evaluation Survey    Therapist reviewed FOTO scores for Morelia Swan on 1/29/2025.   FOTO documents entered into [employer] - see Media section.    Intake Score: 27%         Treatment     Treatment Time In: 1:22 pm  Treatment Time Out: 1:47 pm  Total Treatment time separate from Evaluation time: 25 min    Supervised modalities after being cleared for contraindications: Hot pack applied to the Right wrist for 5 minutes for increased blood flow and circulation, increased tissue extensibility, and pain management.      Morelia performed therapeutic exercises for 12 minutes including:  - Instructed in STM to scar site  - AROM pro/sup, wrist flex/ext, UD/RD (10 ea)  - AROM tendon glides, thumb IP flex, abduction, pinky slides, lifts (10 ea)  - Instructed in precautions, orthosis wear, expected healing time frame  - Provided with compression sleeve to be worn during the day      Morelia participated in dynamic functional therapeutic activities to improve functional performance for 8  minutes, including:  - In hand manipulation med/small poms (2 sets)  - Wrist wheel flex/ext, pro/sup (2 min ea)    Patient Education and Home Exercises     Patient/Family Education Provided:    - Role of OT, goals for OT, scheduling/cancellations - pt verbalized understanding. Discussed insurance limitations with patient.    Written Home Exercises Provided: yes.  Exercises were reviewed and Morelia was able to demonstrate them prior to the end of the session.  Morelia demonstrated good  understanding of the education provided. See EMR under Patient Instructions for exercises provided during therapy sessions.     Pt was advised to perform these exercises free of pain, and to stop performing them if pain occurs.      Assessment     Morelia Swan is a 84 y.o. female referred to outpatient occupational therapy and presents with a medical diagnosis of Z98.890 (ICD-10-CM) - Post-operative state S52.509D (ICD-10-CM) - Closed fracture of distal end of radius with routine healing, unspecified fracture morphology, unspecified laterality, subsequent encounter. Patient presents with edema, pain, decreased scar mobility, decreased ROM and strength interfering with functional use in daily activities.  Morelia Swan will benefit from continued skilled OT services to progress with above deficits and facilitate increased functional use of RUE.    Following medical record review it is determined that pt will benefit from occupational therapy services in order to maximize pain free and/or functional use of right wrist. The following goals were discussed with the patient and patient is in agreement with them as to be addressed in the treatment plan. The patient's rehab potential is Good.     Anticipated barriers to occupational therapy: none    Plan of care discussed with patient: Yes    Patient's spiritual, cultural and educational needs considered and patient is agreeable to the plan of care and goals as stated below:     Medical Necessity is demonstrated by the following  Occupational Profile/History  Co-morbidities and personal factors that may impact the plan of care [] LOW: Brief chart review  [x] MODERATE: Expanded chart  review   [] HIGH: Extensive chart review    Moderate / High Support Documentation: op note, imaging, PMH     Examination  Performance deficits relating to physical, cognitive or psychosocial skills that result in activity limitations and/or participation restrictions  [] LOW: addressing 1-3 Performance deficits  [] MODERATE: 3-5 Performance deficits  [x] HIGH: 5+ Performance deficits (please support below)    Moderate / High Support Documentation:    Physical:  Joint Mobility  Skin Integrity/Scar Formation  Edema   Strength  Pain    Cognitive:  No Deficits    Psychosocial:    Habits  Routines     Treatment Options [] LOW: Limited options  [x] MODERATE: Several options  [] HIGH: Multiple options      Decision Making/ Complexity Score: moderate         The following goals were discussed with the patient and patient is in agreement with them as to be addressed in the treatment plan.     Long Term Goals (to be met by discharge):  Date Goal Met:     1.) Morelia Swan will demonstrate significantly improved functional performance from re-assessment as measured by a FOTO Intake score of more than 47.    Goal Status:   In progress    2.) Morelia Swan will return to near to prior level of function for ADLs and household management reporting independence or modified independence.    Goal Status:   In progress    3. Morelia Swan will report pain 2 out of 10 at worst to increase functional use of affected hand for work and leisure tasks.    Goal Status:   In progress     Short Term Goals (to be met by 3/1/25):  Date Goal Met:     Morelia Swan will be independent with home exercise program with written instructions.    Goal Status:   In progress    Morelia Swan will demonstrate 240 degrees of HDEZ of MF to improve functional performance in ADLs/work/leisure tasks.    Goal Status:   In progress    Morelia Swan will demonstrate 50/50 degrees of wrist ext/flex to improve functional performance in ADLs/work/leisure tasks.    Goal  Status:   In progress    Morelia Swan will demonstrate within 20 lbs of  strength compared to unaffected hand to improve functional grasp for ADLs/work/leisure tasks.    Goal Status:   In progress    Morelia Swan will demonstrate the ability to complete ADL/IADL tasks with 4/10 pain.    Goal Status:   In progress    Morelia Swan will be able to resume significantly greater occupational roles independently or modified as demonstrated by a FOTO Intake score of more than 37.    Goal Status:   In progress       Plan     Pt to be treated by Occupational Therapy 2 times per week for 12 weeks during the certification period from 1/29/2025 to 4/23/25 to achieve the established goals.     Treatment to include: Paraffin, Fluidotherapy, Manual therapy/joint mobilizations, Modalities for pain management, US 3 mhz, Therapeutic exercises/activities., Strengthening, Orthotic Fabrication/Fit/Training, Edema Control, Scar Management, Wound Care, Electrical Modalities, Joint Protection, and Energy Conservation, as well as any other treatments deemed necessary based on the patient's needs or progress.       Priscilla Foster, OTR/L, CHT

## 2025-01-29 NOTE — PATIENT INSTRUCTIONS
"  [facility], OCCUPATIONAL THERAPY  HOME EXERCISE PROGRAM      Wear the splint at night, and during the day with moderate or heavy activity  You may remove the splint for LIGHT activity such as showering, getting dressed, at rest, tabletop tasks  No putting weight through the hand/wrist and no lifting more than 5 lbs at this time    Complete scar massage, 3-5 minutes, 3 times a day:               Use lotion and apply to scar. Message the scar in all directions with enough pressure to move the skin.    Complete the following exercises for 10 repetitions, 5x/day:       AROM: Supination / Pronation   With your elbow by your side, turn your   palm up then turn your palm down.          AROM: Wrist Flexion / Extension               Bend your wrist forward and back as far as possible.  Perform 10 reps with fingers straight  Perform 10 reps with fingers closed in a fist          AROM: Wrist Radial / Ulnar Deviation  Position hand flat on the table.  Bend your wrist from side to side as far as possible.  (Sliding side to side like [employer] wipers)        AROM Hook Fist  - Bend only your middle and end knuckles, creating a "hook" (as if reaching to touch the base of your finger). Hold 2-3 seconds.   - Then straighten your fingers, pointing towards the ceiling.         AROM Table Top ("Wave")   - Bend only your large, bottom knuckles. Keep the tips of your fingers straight and flat like a table top. Hold 3 seconds.   - Then straighten your fingers, pointing towards the ceiling.           AROM Straight Fist  - Bend your bottom and middle knuckles, keeping the tips of your fingers straight, as if pointing towards your elbow  - Try to touch the bottom of your palm with the pads of your fingers. Hold 3 seconds.   - Then straighten your fingers, pointing towards the ceiling.          AROM Full Fist  - Bend every joint in your hand into a fist. Hold 3 seconds.   - Then straighten your fingers, pointing towards the " "ceiling.        AROM: Abduction / Adduction  With hand flat on table, spread all fingers apart,   then bring them together as close as possible.        AROM: Thumb IP Flexion / Extension      Brace thumb below tip joint. Bend joint as far as      possible then straighten.          AROM: Palmar Adduction / Abduction   Rest your small finger on the table. Move thumb   sideways, out and away from   palm. Move back to rest along palm.                          AROM: Composite Flesion ("Pinky Slides")  Touch thumb to tip of small finger. Slide thumb down   small finger into palm.         AROM: MP Extension   With palm on table, lift thumb up.   Hold 3 seconds. Relax and lower thumb.    Therapist: Priscilla Foster, TYLER/L, CHT     Copyright © [facility]. All rights reserved.       "

## 2025-02-04 ENCOUNTER — CLINICAL SUPPORT (OUTPATIENT)
Dept: REHABILITATION | Facility: OTHER | Age: 85
End: 2025-02-04
Payer: MEDICARE

## 2025-02-04 DIAGNOSIS — M25.631 DECREASED RANGE OF MOTION OF RIGHT WRIST: ICD-10-CM

## 2025-02-04 DIAGNOSIS — M25.531 RIGHT WRIST PAIN: Primary | ICD-10-CM

## 2025-02-04 PROCEDURE — 97530 THERAPEUTIC ACTIVITIES: CPT | Mod: PN,CO

## 2025-02-04 PROCEDURE — 97110 THERAPEUTIC EXERCISES: CPT | Mod: PN,CO

## 2025-02-04 PROCEDURE — 97140 MANUAL THERAPY 1/> REGIONS: CPT | Mod: PN,CO

## 2025-02-04 NOTE — PROGRESS NOTES
Occupational Therapy Daily Treatment Note     Date: 2/4/2025  Name: Morelia Swan  Clinic Number: [id number]    Therapy Diagnosis:   1. Right wrist pain        2. Decreased range of motion of right wrist          Medical Diagnosis: Z98.890 (ICD-10-CM) - Post-operative state S52.509D (ICD-10-CM) - Closed fracture of distal end of radius with routine healing, unspecified fracture morphology, unspecified laterality, subsequent encounter     Referring Physician: Ronny Hernandez PA-C  Physician Orders: Eval and Treat  Note:   Right Distal Radius  DOS: 1/6/25  Date of Return to MD: 2/21/25     Date of Injury: 12/31/24  Date of Surgery: 1/6/25  Right ORIF DRF     Evaluation Date: 1/29/2025  Authorization Period: 1/24/25 - 1/24/26  Plan of Care Expiration: 4/23/25  Visit #/ Visits Authorized: 2 of 10  FOTO Completion: Initial eval (1/29/2025)  FOTO #2:  FOTO #3:     Time In: 1:07 pm  Time Out: 1:47 pm  Total Billable Time: 40 min     Precautions: Standard, strengthening/weightbearing      Subjective     History of Current Condition: Morelia Swan is a 84 y.o. year old Right hand dominant female who is s/p right Distal Radius ORIF by Dr. Bill on 1/6/25. She reports she fell onto her right wrist on 12/31/24 and went to urgent care the next day. X-ray revealed DRF requiring surgical management. She presents today with prefab wrist brace. Morelia Swan is referred to Occupational Therapy for evaluation and treatment. Patient presents today alone.       Pt reports: Has been wearing compression sleeve, pain relief with tylenol   Morelia Swan was compliant with home exercise program given last session.   Response to previous treatment: first follow up  Functional change: none - too soon    Pain: 3/10  Location: right hand     Objective     Observation/Appearance:   Moderate swelling throughout wrist and hand  Fully closed incision along volar forearm, thick with fair mobility     Sensation: Patient denies numbness/tingling         Edema:  (Measured circumferential, in centimeters)  Hand/Wrist: Right  1/29/2025 Left  1/29/2025   Wrist Crease 17.5 cm 15.5 cm   DPC 18.3 cm 18.0 cm         ELBOW, WRIST RANGE OF MOTION:   Measured in degrees of active motion with goniometer    Right  1/29/2025 Left  1/29/2025   Elbow Extension/Flexion WNL WNL   Pronation/Supination 80/70 80/80   Wrist Extension/Flexion 35/30 75/50   Ulnar/Radial Deviation 25/15 35/25      Digit AROM:  Measured in degrees of active motion with goniometer and/or distance measured from finger tip to the distal palmar crease (DPC)    Right  1/29/2025 Left  1/29/2025           Long:  MP 0/60 0/90              PIP 0/90 0/110              DIP 0/63 0/70              HDEZ 213 270           Thumb: MP                    IP           Rad ADD/ABD           Pal ADD/ABD           Opposition MP crease of SF DPC         STRENGTH: Not tested due to precautions  (Measured in pounds using a Dynamometer and pinch meter)    Right    Left       Setting 2        Average       Key       3 Pt       Tip             Intake Outcome Measure for FOTO Initial Evaluation Survey     Therapist reviewed FOTO scores for Morelia Swan on 1/29/2025.   FOTO documents entered into [employer] - see Media section.     Intake Score: 27%           Treatment     Supervised modalities after being cleared for contraindications: Hot pack applied to the Right wrist for 5 minutes for increased blood flow and circulation, increased tissue extensibility, and pain management.        Morelia performed therapeutic exercises for 12 minutes including:  - Instructed in STM to scar site  - AROM pro/sup, wrist flex/ext, UD/RD (10 ea)  - AROM tendon glides, thumb IP flex, abduction, pinky slides, lifts (10 ea)  - Instructed in precautions, orthosis wear, expected healing time frame  - Hook to fist with marker 10 reps   - Provided with compression sleeve to be worn during the day        Morelia participated in dynamic functional therapeutic  "activities to improve functional performance for 8  minutes, including:  - In hand manipulation med/small poms (2 sets)  - Wrist wheel flex/ext, pro/sup (2 min ea)  - Juxtacisor 1"     Home Exercises and Education Provided     Education provided:   - HEP   - Progress towards goals     Written Home Exercises Provided: Patient instructed to cont prior HEP.  Exercises were reviewed and Morelia was able to demonstrate them prior to the end of the session.  Morelia demonstrated good  understanding of the HEP provided.   .   See EMR under Patient Instructions for exercises provided prior visit.        Assessment     Ms. Thibodeaux presents today for her first follow. Continued c/o pain and swelling present about wrist. Reports compliance to HEP and orthosis wear. Stressed improtance      Morelia is progressing well towards her goals and there are no updates to goals at this time. Pt prognosis is Good. Pt will continue to benefit from skilled outpatient occupational therapy to address the deficits listed in the problem list on initial evaluation provide pt/family education and to maximize pt's level of independence in the home and community environment.     Pt's spiritual, cultural and educational needs considered and pt agreeable to plan of care and goals.    The following goals were discussed with the patient and patient is in agreement with them as to be addressed in the treatment plan.      Long Term Goals (to be met by discharge):  Date Goal Met:       1.) Morelia Swan will demonstrate significantly improved functional performance from re-assessment as measured by a FOTO Intake score of more than 47.     Goal Status:   In progress     2.) Morelia Swan will return to near to prior level of function for ADLs and household management reporting independence or modified independence.     Goal Status:   In progress     3. Morelia Swan will report pain 2 out of 10 at worst to increase functional use of affected hand for work and " leisure tasks.     Goal Status:   In progress      Short Term Goals (to be met by 3/1/25):  Date Goal Met:       Morelia Swan will be independent with home exercise program with written instructions.     Goal Status:   In progress     Morelia Swan will demonstrate 240 degrees of HDEZ of MF to improve functional performance in ADLs/work/leisure tasks.     Goal Status:   In progress     Morelia Swan will demonstrate 50/50 degrees of wrist ext/flex to improve functional performance in ADLs/work/leisure tasks.     Goal Status:   In progress     Morelia Swan will demonstrate within 20 lbs of  strength compared to unaffected hand to improve functional grasp for ADLs/work/leisure tasks.     Goal Status:   In progress     Morelia Swan will demonstrate the ability to complete ADL/IADL tasks with 4/10 pain.     Goal Status:   In progress     Morelia Swan will be able to resume significantly greater occupational roles independently or modified as demonstrated by a FOTO Intake score of more than 37.     Goal Status:   In progress        Plan   Continue skilled occupational therapy with individualized plan of care focusing on increasing functional independence and participation in meaningful daily activities.    Updates/Grading for next session: progress as tolerated per protocol       TJ Gonsalez

## 2025-02-04 NOTE — PROGRESS NOTES
Occupational Therapy Daily Treatment Note     Date: 2/4/2025  Name: Morelia Swan  Clinic Number: [id number]    Therapy Diagnosis:   1. Right wrist pain        2. Decreased range of motion of right wrist          Medical Diagnosis: Z98.890 (ICD-10-CM) - Post-operative state S52.509D (ICD-10-CM) - Closed fracture of distal end of radius with routine healing, unspecified fracture morphology, unspecified laterality, subsequent encounter     Referring Physician: Ronny Hernandez PA-C  Physician Orders: Eval and Treat  Note:   Right Distal Radius  DOS: 1/6/25  Date of Return to MD: 2/21/25     Date of Injury: 12/31/24  Date of Surgery: 1/6/25  Right ORIF DRF     Evaluation Date: 1/29/2025  Authorization Period: 1/24/25 - 1/24/26  Plan of Care Expiration: 4/23/25  Visit #/ Visits Authorized: 1 of 1  FOTO Completion: Initial eval (1/29/2025)  FOTO #2:  FOTO #3:     Time In: 1:07 pm  Time Out: 1:47 pm  Total Billable Time: 40 min     Precautions: Standard, strengthening/weightbearing      Subjective     History of Current Condition: Morelia Swan is a 84 y.o. year old Right hand dominant female who is s/p right Distal Radius ORIF by Dr. Bill on 1/6/25. She reports she fell onto her right wrist on 12/31/24 and went to urgent care the next day. X-ray revealed DRF requiring surgical management. She presents today with prefab wrist brace. Morelia Swan is referred to Occupational Therapy for evaluation and treatment. Patient presents today alone.       Pt reports: ***  Morelia Swan was compliant with home exercise program given last session.   Response to previous treatment: first follow up  Functional change: none - too soon    Pain: ***/10  Location: ***    Objective     Observation/Appearance:   Moderate swelling throughout wrist and hand  Fully closed incision along volar forearm, thick with fair mobility     Sensation: Patient denies numbness/tingling        Edema:  (Measured circumferential, in centimeters)  Hand/Wrist:  Right  1/29/2025 Left  1/29/2025   Wrist Crease 17.5 cm 15.5 cm   DPC 18.3 cm 18.0 cm         ELBOW, WRIST RANGE OF MOTION:   Measured in degrees of active motion with goniometer    Right  1/29/2025 Left  1/29/2025   Elbow Extension/Flexion WNL WNL   Pronation/Supination 80/70 80/80   Wrist Extension/Flexion 35/30 75/50   Ulnar/Radial Deviation 25/15 35/25      Digit AROM:  Measured in degrees of active motion with goniometer and/or distance measured from finger tip to the distal palmar crease (DPC)    Right  1/29/2025 Left  1/29/2025           Long:  MP 0/60 0/90              PIP 0/90 0/110              DIP 0/63 0/70              HDEZ 213 270           Thumb: MP                    IP           Rad ADD/ABD           Pal ADD/ABD           Opposition MP crease of SF DPC         STRENGTH: Not tested due to precautions  (Measured in pounds using a Dynamometer and pinch meter)    Right    Left       Setting 2        Average       Key       3 Pt       Tip             Intake Outcome Measure for FOTO Initial Evaluation Survey     Therapist reviewed FOTO scores for Morelia Swan on 1/29/2025.   FOTO documents entered into [employer] - see Media section.     Intake Score: 27%           Treatment     Supervised modalities after being cleared for contraindications: Hot pack applied to the Right wrist for 5 minutes for increased blood flow and circulation, increased tissue extensibility, and pain management.        Morelia performed therapeutic exercises for 12 minutes including:  - Instructed in STM to scar site  - AROM pro/sup, wrist flex/ext, UD/RD (10 ea)  - AROM tendon glides, thumb IP flex, abduction, pinky slides, lifts (10 ea)  - Instructed in precautions, orthosis wear, expected healing time frame  - Provided with compression sleeve to be worn during the day        Morelia participated in dynamic functional therapeutic activities to improve functional performance for 8  minutes, including:  - In hand manipulation med/small  "poms (2 sets)  - Wrist wheel flex/ext, pro/sup (2 min ea)    Home Exercises and Education Provided     Education provided:   - ***  - Progress towards goals     Written Home Exercises Provided: {Blank single:82217::"yes","Patient instructed to cont prior HEP"}.  Exercises were reviewed and Morelia was able to demonstrate them prior to the end of the session.  Morelia demonstrated {Desc; good/fair/poor:82599} understanding of the HEP provided.   .   See EMR under {Blank single:67033::"Media","Patient Instructions"} for exercises provided {Blank single:60606::"2/4/2025","prior visit"}.        Assessment     ***     Morelia is progressing well towards her goals and there are no updates to goals at this time. Pt prognosis is {REHAB PROGNOSIS OHS:55334}. Pt will continue to benefit from skilled outpatient occupational therapy to address the deficits listed in the problem list on initial evaluation provide pt/family education and to maximize pt's level of independence in the home and community environment.     Pt's spiritual, cultural and educational needs considered and pt agreeable to plan of care and goals.    The following goals were discussed with the patient and patient is in agreement with them as to be addressed in the treatment plan.      Long Term Goals (to be met by discharge):  Date Goal Met:       1.) Morelia Swan will demonstrate significantly improved functional performance from re-assessment as measured by a FOTO Intake score of more than 47.     Goal Status:   In progress     2.) Morelia Swan will return to near to prior level of function for ADLs and household management reporting independence or modified independence.     Goal Status:   In progress     3. Morelia Swan will report pain 2 out of 10 at worst to increase functional use of affected hand for work and leisure tasks.     Goal Status:   In progress      Short Term Goals (to be met by 3/1/25):  Date Goal Met:       Morelia Swan will be independent with " home exercise program with written instructions.     Goal Status:   In progress     Morelia Swan will demonstrate 240 degrees of HDEZ of MF to improve functional performance in ADLs/work/leisure tasks.     Goal Status:   In progress     Morelia Swan will demonstrate 50/50 degrees of wrist ext/flex to improve functional performance in ADLs/work/leisure tasks.     Goal Status:   In progress     Morelia Swan will demonstrate within 20 lbs of  strength compared to unaffected hand to improve functional grasp for ADLs/work/leisure tasks.     Goal Status:   In progress     Morleia Swan will demonstrate the ability to complete ADL/IADL tasks with 4/10 pain.     Goal Status:   In progress     Morelia Swan will be able to resume significantly greater occupational roles independently or modified as demonstrated by a FOTO Intake score of more than 37.     Goal Status:   In progress        Plan   Continue skilled occupational therapy with individualized plan of care focusing on increasing functional independence and participation in meaningful daily activities.    Updates/Grading for next session: ***      Priscilla Foster, OT

## 2025-02-06 ENCOUNTER — CLINICAL SUPPORT (OUTPATIENT)
Dept: REHABILITATION | Facility: OTHER | Age: 85
End: 2025-02-06
Payer: MEDICARE

## 2025-02-06 DIAGNOSIS — M25.531 RIGHT WRIST PAIN: Primary | ICD-10-CM

## 2025-02-06 DIAGNOSIS — M25.631 DECREASED RANGE OF MOTION OF RIGHT WRIST: ICD-10-CM

## 2025-02-06 PROCEDURE — 97018 PARAFFIN BATH THERAPY: CPT | Mod: PN,CO

## 2025-02-06 PROCEDURE — 97530 THERAPEUTIC ACTIVITIES: CPT | Mod: PN,CO

## 2025-02-06 PROCEDURE — 97110 THERAPEUTIC EXERCISES: CPT | Mod: PN,CO

## 2025-02-06 PROCEDURE — 97140 MANUAL THERAPY 1/> REGIONS: CPT | Mod: PN,CO

## 2025-02-06 NOTE — PROGRESS NOTES
Occupational Therapy Daily Treatment Note     Date: 2/4/2025  Name: Morelia Swan  Clinic Number: [id number]    Therapy Diagnosis:   1. Right wrist pain        2. Decreased range of motion of right wrist          Medical Diagnosis: Z98.890 (ICD-10-CM) - Post-operative state S52.509D (ICD-10-CM) - Closed fracture of distal end of radius with routine healing, unspecified fracture morphology, unspecified laterality, subsequent encounter     Referring Physician: Ronny Hernandez PA-C  Physician Orders: Eval and Treat  Note:   Right Distal Radius  DOS: 1/6/25  Date of Return to MD: 2/21/25     Date of Injury: 12/31/24  Date of Surgery: 1/6/25  Right ORIF DRF     Evaluation Date: 1/29/2025  Authorization Period: 1/24/25 - 1/24/26  Plan of Care Expiration: 4/23/25  Visit #/ Visits Authorized: 2 of 10  FOTO Completion: Initial eval (1/29/2025)  FOTO #2:  FOTO #3:     Time In: 10:15 am  Time Out: 11:00 am  Total Billable Time: 45 min     Precautions: Standard, strengthening/weightbearing      Subjective     History of Current Condition: Morelia Swan is a 84 y.o. year old Right hand dominant female who is s/p right Distal Radius ORIF by Dr. Bill on 1/6/25. She reports she fell onto her right wrist on 12/31/24 and went to urgent care the next day. X-ray revealed DRF requiring surgical management. She presents today with prefab wrist brace. Morelia Swan is referred to Occupational Therapy for evaluation and treatment. Patient presents today alone.       Pt reports: Has been wearing compression sleeve, pain relief with tylenol   Morelia Swan was compliant with home exercise program given last session.   Response to previous treatment: first follow up  Functional change: none - too soon    Pain: 3/10  Location: right hand     Objective     Observation/Appearance:   Moderate swelling throughout wrist and hand  Fully closed incision along volar forearm, thick with fair mobility     Sensation: Patient denies numbness/tingling         Edema:  (Measured circumferential, in centimeters)  Hand/Wrist: Right  1/29/2025 Left  1/29/2025   Wrist Crease 17.5 cm 15.5 cm   DPC 18.3 cm 18.0 cm         ELBOW, WRIST RANGE OF MOTION:   Measured in degrees of active motion with goniometer    Right  1/29/2025 Left  1/29/2025   Elbow Extension/Flexion WNL WNL   Pronation/Supination 80/70 80/80   Wrist Extension/Flexion 35/30 75/50   Ulnar/Radial Deviation 25/15 35/25      Digit AROM:  Measured in degrees of active motion with goniometer and/or distance measured from finger tip to the distal palmar crease (DPC)    Right  1/29/2025 Left  1/29/2025           Long:  MP 0/60 0/90              PIP 0/90 0/110              DIP 0/63 0/70              HDEZ 213 270           Thumb: MP                    IP           Rad ADD/ABD           Pal ADD/ABD           Opposition MP crease of SF DPC         STRENGTH: Not tested due to precautions  (Measured in pounds using a Dynamometer and pinch meter)    Right    Left       Setting 2        Average       Key       3 Pt       Tip             Intake Outcome Measure for FOTO Initial Evaluation Survey     Therapist reviewed FOTO scores for Morelia Swan on 1/29/2025.   FOTO documents entered into [employer] - see Media section.     Intake Score: 27%           Treatment     Supervised modalities after being cleared for contraindications: Paraffin with MHP applied to the Right wrist for 10 minutes for increased blood flow and circulation, increased tissue extensibility, and pain management.      Morelia received the following manual therapy techniques for 10 minutes:      Morelia performed therapeutic exercises for 15 minutes including:  - Instructed in STM to scar site  - AROM pro/sup, wrist flex/ext, UD/RD (10 ea)  - AROM tendon glides, thumb IP flex, abduction, pinky slides, lifts (10 ea)  - Hook to fist with marker 10 reps   - Reviewed HEP     Morelia participated in dynamic functional therapeutic activities to improve functional  "performance for 10 minutes, including:  - In hand manipulation med/small poms (2 sets)  - Wrist wheel flex/ext, pro/sup (2 min ea)  - Juxtacisor 1"     Home Exercises and Education Provided     Education provided:   - HEP   - Progress towards goals     Written Home Exercises Provided: Patient instructed to cont prior HEP.  Exercises were reviewed and Morelia was able to demonstrate them prior to the end of the session.  Morelia demonstrated good  understanding of the HEP provided.   .   See EMR under Patient Instructions for exercises provided prior visit.        Assessment     Ms. Thibodeaux presents today for her first follow up. Continued c/o pain and swelling present about wrist. Endorses compliance to HEP and orthosis wear. Plan to progress as tolerated.     Morelia is progressing well towards her goals and there are no updates to goals at this time. Pt prognosis is Good. Pt will continue to benefit from skilled outpatient occupational therapy to address the deficits listed in the problem list on initial evaluation provide pt/family education and to maximize pt's level of independence in the home and community environment.     Pt's spiritual, cultural and educational needs considered and pt agreeable to plan of care and goals.    The following goals were discussed with the patient and patient is in agreement with them as to be addressed in the treatment plan.      Long Term Goals (to be met by discharge):  Date Goal Met:       1.) Morelia Swan will demonstrate significantly improved functional performance from re-assessment as measured by a FOTO Intake score of more than 47.     Goal Status:   In progress     2.) Morelia Swan will return to near to prior level of function for ADLs and household management reporting independence or modified independence.     Goal Status:   In progress     3. Morelia Swan will report pain 2 out of 10 at worst to increase functional use of affected hand for work and leisure tasks.     Goal " Status:   In progress      Short Term Goals (to be met by 3/1/25):  Date Goal Met:       Morelia Swan will be independent with home exercise program with written instructions.     Goal Status:   In progress     Morelia Swan will demonstrate 240 degrees of HDEZ of MF to improve functional performance in ADLs/work/leisure tasks.     Goal Status:   In progress     Morelia Swan will demonstrate 50/50 degrees of wrist ext/flex to improve functional performance in ADLs/work/leisure tasks.     Goal Status:   In progress     Morelia Swan will demonstrate within 20 lbs of  strength compared to unaffected hand to improve functional grasp for ADLs/work/leisure tasks.     Goal Status:   In progress     Morelia Swan will demonstrate the ability to complete ADL/IADL tasks with 4/10 pain.     Goal Status:   In progress     Morelia Swan will be able to resume significantly greater occupational roles independently or modified as demonstrated by a FOTO Intake score of more than 37.     Goal Status:   In progress        Plan   Pt to be treated by Occupational Therapy 2 times per week for 12 weeks during the certification period from 1/29/2025 to 4/23/25 to achieve the established goals.      Treatment to include: Paraffin, Fluidotherapy, Manual therapy/joint mobilizations, Modalities for pain management, US 3 mhz, Therapeutic exercises/activities., Strengthening, Orthotic Fabrication/Fit/Training, Edema Control, Scar Management, Wound Care, Electrical Modalities, Joint Protection, and Energy Conservation, as well as any other treatments deemed necessary based on the patient's needs or progress.    Updates/Grading for next session: progress as tolerated per protocol       TJ Gonslaez

## 2025-02-06 NOTE — PROGRESS NOTES
Occupational Therapy Daily Treatment Note     Date: 2/6/2025  Name: Morelia Swan  Clinic Number: [id number]    Therapy Diagnosis:   1. Right wrist pain        2. Decreased range of motion of right wrist            Medical Diagnosis: Z98.890 (ICD-10-CM) - Post-operative state S52.509D (ICD-10-CM) - Closed fracture of distal end of radius with routine healing, unspecified fracture morphology, unspecified laterality, subsequent encounter     Referring Physician: Ronny Hernandez PA-C  Physician Orders: Eval and Treat  Note:   Right Distal Radius  DOS: 1/6/25  Date of Return to MD: 2/21/25     Date of Injury: 12/31/24  Date of Surgery: 1/6/25  Right ORIF DRF     Evaluation Date: 1/29/2025  Authorization Period: 1/24/25 - 1/24/26  Plan of Care Expiration: 4/23/25  Visit #/ Visits Authorized: 3 of 10  FOTO Completion: Initial eval (1/29/2025)  FOTO #2:  FOTO #3:     Time In: 10:05 am  Time Out: 10:50 am  Total Billable Time: 45 min     Precautions: Standard, strengthening/weightbearing      Subjective     History of Current Condition: Morelia Swan is a 84 y.o. year old Right hand dominant female who is s/p right Distal Radius ORIF by Dr. Bill on 1/6/25. She reports she fell onto her right wrist on 12/31/24 and went to urgent care the next day. X-ray revealed DRF requiring surgical management. She presents today with prefab wrist brace. Morelia Swan is referred to Occupational Therapy for evaluation and treatment. Patient presents today alone.       Pt reports: Has been wearing compression sleeve, pain relief with tylenol     Morelia Swan was compliant with home exercise program given last session.   Response to previous treatment: tolerated well, noted pain relief   Functional change: none - too soon    Pain: 3/10  Location: right hand     Objective     Observation/Appearance:   Moderate swelling throughout wrist and hand  Fully closed incision along volar forearm, thick with fair mobility     Sensation: Patient denies  numbness/tingling        Edema:  (Measured circumferential, in centimeters)  Hand/Wrist: Right  1/29/2025 Left  1/29/2025   Wrist Crease 17.5 cm 15.5 cm   DPC 18.3 cm 18.0 cm         ELBOW, WRIST RANGE OF MOTION:   Measured in degrees of active motion with goniometer    Right  1/29/2025 Left  1/29/2025   Elbow Extension/Flexion WNL WNL   Pronation/Supination 80/70 80/80   Wrist Extension/Flexion 35/30 75/50   Ulnar/Radial Deviation 25/15 35/25      Digit AROM:  Measured in degrees of active motion with goniometer and/or distance measured from finger tip to the distal palmar crease (DPC)    Right  1/29/2025 Left  1/29/2025           Long:  MP 0/60 0/90              PIP 0/90 0/110              DIP 0/63 0/70              HDEZ 213 270           Thumb: MP                    IP           Rad ADD/ABD           Pal ADD/ABD           Opposition MP crease of SF DPC         STRENGTH: Not tested due to precautions  (Measured in pounds using a Dynamometer and pinch meter)    Right    Left       Setting 2        Average       Key       3 Pt       Tip             Intake Outcome Measure for FOTO Initial Evaluation Survey     Therapist reviewed FOTO scores for Morelia Swan on 1/29/2025.   FOTO documents entered into [employer] - see Media section.     Intake Score: 27%           Treatment     Supervised modalities after being cleared for contraindications: Paraffin applied to right hand for 10 minutes for increased blood flow and circulation, increased tissue extensibility, and pain management.     Morelia received the following manual therapy techniques for 10 minutes:   - STM to scar, IASTM to forearm      Morelia performed therapeutic exercises for 15 minutes including:  - Instructed in STM to scar site  - AROM pro/sup, wrist flex/ext, UD/RD (10 ea)  - AROM tendon glides, thumb IP flex, abduction, pinky slides, lifts (10 ea)  - Instructed in precautions, orthosis wear, expected healing time frame- NT  - Hook to fist with marker 10  "reps   - Provided dycem for scar management   - Prayer stretch (2 x 30 seconds)        Morelia participated in dynamic functional therapeutic activities to improve functional performance for 10  minutes, including:  - In hand manipulation med/small poms (2 sets) - NT   - in hand manipulation coins (1 set)   - Wrist wheel flex/ext, pro/sup (2 min ea)  - Towel scrunches; pain noted distal portion of scar *  - Juxtacisor 1" - NT    Home Exercises and Education Provided     Education provided:   - HEP   - Progress towards goals     Written Home Exercises Provided: Patient instructed to cont prior HEP.  Exercises were reviewed and Morelia was able to demonstrate them prior to the end of the session.  Morelia demonstrated good  understanding of the HEP provided.   .   See EMR under Patient Instructions for exercises provided prior visit.        Assessment     Ms. Thibodeaux tolerated treatment tasks well; requires occasional cueing for tasks.  Remains limited by decreased ROM and edema. Will continue to monitor and progress as tolerated.     Morelia is progressing well towards her goals and there are no updates to goals at this time. Pt prognosis is Good. Pt will continue to benefit from skilled outpatient occupational therapy to address the deficits listed in the problem list on initial evaluation provide pt/family education and to maximize pt's level of independence in the home and community environment.     Pt's spiritual, cultural and educational needs considered and pt agreeable to plan of care and goals.    The following goals were discussed with the patient and patient is in agreement with them as to be addressed in the treatment plan.      Long Term Goals (to be met by discharge):  Date Goal Met:       1.) Morelia Swan will demonstrate significantly improved functional performance from re-assessment as measured by a FOTO Intake score of more than 47.     Goal Status:   In progress     2.) Morelia Swan will return to near to " prior level of function for ADLs and household management reporting independence or modified independence.     Goal Status:   In progress     3. Morelia Swan will report pain 2 out of 10 at worst to increase functional use of affected hand for work and leisure tasks.     Goal Status:   In progress      Short Term Goals (to be met by 3/1/25):  Date Goal Met:       Morelia Swan will be independent with home exercise program with written instructions.     Goal Status:   In progress     Morelia Swan will demonstrate 240 degrees of HDEZ of MF to improve functional performance in ADLs/work/leisure tasks.     Goal Status:   In progress     Morelia Swan will demonstrate 50/50 degrees of wrist ext/flex to improve functional performance in ADLs/work/leisure tasks.     Goal Status:   In progress     Morelia Swan will demonstrate within 20 lbs of  strength compared to unaffected hand to improve functional grasp for ADLs/work/leisure tasks.     Goal Status:   In progress     Morelia Swan will demonstrate the ability to complete ADL/IADL tasks with 4/10 pain.     Goal Status:   In progress     Morelia Swan will be able to resume significantly greater occupational roles independently or modified as demonstrated by a FOTO Intake score of more than 37.     Goal Status:   In progress        Plan   Continue skilled occupational therapy with individualized plan of care focusing on increasing functional independence and participation in meaningful daily activities.    Updates/Grading for next session: progress as tolerated per protocol       TJ Gonsalez

## 2025-02-11 ENCOUNTER — CLINICAL SUPPORT (OUTPATIENT)
Dept: REHABILITATION | Facility: OTHER | Age: 85
End: 2025-02-11
Payer: MEDICARE

## 2025-02-11 DIAGNOSIS — M25.531 RIGHT WRIST PAIN: Primary | ICD-10-CM

## 2025-02-11 DIAGNOSIS — M25.631 DECREASED RANGE OF MOTION OF RIGHT WRIST: ICD-10-CM

## 2025-02-11 PROCEDURE — 97140 MANUAL THERAPY 1/> REGIONS: CPT | Mod: PN,CO

## 2025-02-11 PROCEDURE — 97110 THERAPEUTIC EXERCISES: CPT | Mod: PN,CO

## 2025-02-11 PROCEDURE — 97530 THERAPEUTIC ACTIVITIES: CPT | Mod: PN,CO

## 2025-02-11 NOTE — PROGRESS NOTES
Occupational Therapy Daily Treatment Note     Date: 2/11/2025  Name: Morelia Swan  Clinic Number: [id number]    Therapy Diagnosis:   1. Right wrist pain        2. Decreased range of motion of right wrist              Medical Diagnosis: Z98.890 (ICD-10-CM) - Post-operative state S52.509D (ICD-10-CM) - Closed fracture of distal end of radius with routine healing, unspecified fracture morphology, unspecified laterality, subsequent encounter     Referring Physician: Ronny Hernandez PA-C  Physician Orders: Eval and Treat  Note:   Right Distal Radius  DOS: 1/6/25  Date of Return to MD: 2/21/25     Date of Injury: 12/31/24  Date of Surgery: 1/6/25  Right ORIF DRF     Evaluation Date: 1/29/2025  Authorization Period: 1/24/25 - 1/24/26  Plan of Care Expiration: 4/23/25  Visit #/ Visits Authorized: 4 of 10  FOTO Completion: Initial eval (1/29/2025)  FOTO #2:  FOTO #3:     Time In: 10:10 am  Time Out: 10:53 am  Total Billable Time: 43 min     Precautions: Standard, strengthening/weightbearing      Subjective     History of Current Condition: Morelia Swan is a 84 y.o. year old Right hand dominant female who is s/p right Distal Radius ORIF by Dr. Bill on 1/6/25. She reports she fell onto her right wrist on 12/31/24 and went to urgent care the next day. X-ray revealed DRF requiring surgical management. She presents today with prefab wrist brace. Morelia Swan is referred to Occupational Therapy for evaluation and treatment. Patient presents today alone.       Pt reports: Increased swelling and stiffness worse in the mornings although overall feels improvement     Morelia Swan was compliant with home exercise program given last session.   Response to previous treatment: tolerated well, noted pain relief   Functional change: n/a    Pain: 8/10  Location: right hand     Objective     Observation/Appearance:   Moderate swelling throughout wrist and hand  Fully closed incision along volar forearm, thick with fair mobility      Sensation: Patient denies numbness/tingling        Edema:  (Measured circumferential, in centimeters)  Hand/Wrist: Right  1/29/2025 Left  1/29/2025   Wrist Crease 17.5 cm 15.5 cm   DPC 18.3 cm 18.0 cm         ELBOW, WRIST RANGE OF MOTION:   Measured in degrees of active motion with goniometer    Right  1/29/2025 Left  1/29/2025   Elbow Extension/Flexion WNL WNL   Pronation/Supination 80/70 80/80   Wrist Extension/Flexion 35/30 75/50   Ulnar/Radial Deviation 25/15 35/25      Digit AROM:  Measured in degrees of active motion with goniometer and/or distance measured from finger tip to the distal palmar crease (DPC)    Right  1/29/2025 Left  1/29/2025           Long:  MP 0/60 0/90              PIP 0/90 0/110              DIP 0/63 0/70              HDEZ 213 270           Thumb: MP                    IP           Rad ADD/ABD           Pal ADD/ABD           Opposition MP crease of SF DPC         STRENGTH: Not tested due to precautions  (Measured in pounds using a Dynamometer and pinch meter)    Right    Left       Setting 2        Average       Key       3 Pt       Tip             Intake Outcome Measure for FOTO Initial Evaluation Survey     Therapist reviewed FOTO scores for Morelia Swan on 1/29/2025.   FOTO documents entered into [employer] - see Media section.     Intake Score: 27%           Treatment     Supervised modalities after being cleared for contraindications: Paraffin applied to right hand for 10 minutes for increased blood flow and circulation, increased tissue extensibility, and pain management.     Morelia received the following manual therapy techniques for 12 minutes:   - STM to scar, IASTM to forearm   - petey      Morelia performed therapeutic exercises for 10 minutes including:  - Instructed in STM to scar site  - AROM pro/sup, wrist flex/ext, UD/RD closed/open fist (10 ea)  - AROM tendon glides, thumb IP flex, abduction, pinky slides, lifts (10 ea)  - Instructed in precautions, orthosis wear, expected  "healing time frame- NT  - Hook to fist with marker 20 reps   - Prayer stretch (2 x 30 seconds)  - NT       Morelia participated in dynamic functional therapeutic activities to improve functional performance for 11  minutes, including:  - In hand manipulation med/small poms (2 sets) - NT   - in hand manipulation coins (1 set)   - Wrist wheel flex/ext, pro/sup (2 min ea)  - Towel scrunches   - Juxtacisor 2"     Home Exercises and Education Provided     Education provided:   - HEP   - Progress towards goals     Written Home Exercises Provided: Patient instructed to cont prior HEP.  Exercises were reviewed and Morelia was able to demonstrate them prior to the end of the session.  Morelia demonstrated good  understanding of the HEP provided.   .   See EMR under Patient Instructions for exercises provided prior visit.        Assessment     Ms. Thibodeaux tolerated treatment all tasks fairly well; Continued c/o of ulnar sided pain and mild pain noted about dorsal portion of scar.  Remains limited by decreased ROM and edema. Will continue to monitor and progress as tolerated.     Morelia is progressing well towards her goals and there are no updates to goals at this time. Pt prognosis is Good. Pt will continue to benefit from skilled outpatient occupational therapy to address the deficits listed in the problem list on initial evaluation provide pt/family education and to maximize pt's level of independence in the home and community environment.     Pt's spiritual, cultural and educational needs considered and pt agreeable to plan of care and goals.    The following goals were discussed with the patient and patient is in agreement with them as to be addressed in the treatment plan.      Long Term Goals (to be met by discharge):  Date Goal Met:       1.) Morelia Swan will demonstrate significantly improved functional performance from re-assessment as measured by a FOTO Intake score of more than 47.     Goal Status:   In progress     2.) " Morelia Swan will return to near to prior level of function for ADLs and household management reporting independence or modified independence.     Goal Status:   In progress     3. Morelia Swan will report pain 2 out of 10 at worst to increase functional use of affected hand for work and leisure tasks.     Goal Status:   In progress      Short Term Goals (to be met by 3/1/25):  Date Goal Met:       Morelia Swan will be independent with home exercise program with written instructions.     Goal Status:   In progress     Morelia Swan will demonstrate 240 degrees of HDEZ of MF to improve functional performance in ADLs/work/leisure tasks.     Goal Status:   In progress     Morelia Swan will demonstrate 50/50 degrees of wrist ext/flex to improve functional performance in ADLs/work/leisure tasks.     Goal Status:   In progress     Morelia Swan will demonstrate within 20 lbs of  strength compared to unaffected hand to improve functional grasp for ADLs/work/leisure tasks.     Goal Status:   In progress     Morelia Swan will demonstrate the ability to complete ADL/IADL tasks with 4/10 pain.     Goal Status:   In progress     Morelia Swan will be able to resume significantly greater occupational roles independently or modified as demonstrated by a FOTO Intake score of more than 37.     Goal Status:   In progress        Plan   Continue skilled occupational therapy with individualized plan of care focusing on increasing functional independence and participation in meaningful daily activities.    Updates/Grading for next session: progress as tolerated per protocol       TJ Gonsalez

## 2025-02-18 ENCOUNTER — CLINICAL SUPPORT (OUTPATIENT)
Dept: REHABILITATION | Facility: OTHER | Age: 85
End: 2025-02-18
Payer: MEDICARE

## 2025-02-18 DIAGNOSIS — M25.531 RIGHT WRIST PAIN: Primary | ICD-10-CM

## 2025-02-18 DIAGNOSIS — M25.631 DECREASED RANGE OF MOTION OF RIGHT WRIST: ICD-10-CM

## 2025-02-18 PROCEDURE — 97140 MANUAL THERAPY 1/> REGIONS: CPT | Mod: PN

## 2025-02-18 PROCEDURE — 97018 PARAFFIN BATH THERAPY: CPT | Mod: PN

## 2025-02-18 PROCEDURE — 97530 THERAPEUTIC ACTIVITIES: CPT | Mod: PN

## 2025-02-18 PROCEDURE — 97110 THERAPEUTIC EXERCISES: CPT | Mod: PN

## 2025-02-18 NOTE — PROGRESS NOTES
Occupational Therapy Daily Treatment Note     Date: 2/18/2025  Name: Morelia Swan  Clinic Number: [id number]    Therapy Diagnosis:   1. Right wrist pain        2. Decreased range of motion of right wrist            Medical Diagnosis: Z98.890 (ICD-10-CM) - Post-operative state S52.509D (ICD-10-CM) - Closed fracture of distal end of radius with routine healing, unspecified fracture morphology, unspecified laterality, subsequent encounter     Referring Physician: Ronny Hernandez PA-C  Physician Orders: Eval and Treat  Note:   Right Distal Radius  DOS: 1/6/25  Date of Return to MD: 2/21/25     Date of Injury: 12/31/24  Date of Surgery: 1/6/25  Right ORIF DRF     Evaluation Date: 1/29/2025  Authorization Period: 1/24/25 - 1/24/26  Plan of Care Expiration: 4/23/25  Visit #/ Visits Authorized: 5 of 10  FOTO Completion: Initial eval (1/29/2025)  FOTO #2:  FOTO #3:     Time In: 10:13 am  Time Out: 11:03 am  Total Billable Time: 50 min     Precautions: Standard, strengthening/weightbearing      Subjective     History of Current Condition: Morelia Swan is a 84 y.o. year old Right hand dominant female who is s/p right Distal Radius ORIF by Dr. Bill on 1/6/25. She reports she fell onto her right wrist on 12/31/24 and went to urgent care the next day. X-ray revealed DRF requiring surgical management. She presents today with prefab wrist brace. Morelia Swan is referred to Occupational Therapy for evaluation and treatment. Patient presents today alone.     Pt reports: no pain, wearing splint at all times, feels most stiff in the mornings    Morelia Swan was compliant with home exercise program given last session.   Response to previous treatment: tolerated well, noted pain relief   Functional change: n/a    Pain: 0/10  Location: right hand     Objective     Observation/Appearance:   Moderate swelling throughout wrist and hand  Fully closed incision along volar forearm, thick with fair mobility     Sensation: Patient denies  numbness/tingling        Edema:  (Measured circumferential, in centimeters)  Hand/Wrist: Right  1/29/2025 Right  2/18/25 Left  1/29/2025   Wrist Crease 17.5 cm 16.8 15.5 cm   DPC 18.3 cm 18.3 18.0 cm         ELBOW, WRIST RANGE OF MOTION:   Measured in degrees of active motion with goniometer    Right  1/29/2025 Right  2/18/25 Left  1/29/2025   Elbow Extension/Flexion WNL  WNL   Pronation/Supination 80/70 80/80 80/80   Wrist Extension/Flexion 35/30 50/35 75/50   Ulnar/Radial Deviation 25/15 25/15 35/25      Digit AROM:  Measured in degrees of active motion with goniometer and/or distance measured from finger tip to the distal palmar crease (DPC)    Right  1/29/2025 Right  2/18/25 Left  1/29/2025            Long:  MP 0/60 66 0/90              PIP 0/90 90 0/110              DIP 0/63 67 0/70              HDEZ 213 223 270            Thumb: MP                     IP            Rad ADD/ABD            Pal ADD/ABD            Opposition MP crease of SF DPC DPC         STRENGTH: (Measured in pounds using a Dynamometer and pinch meter)    Right  2/18/25  Left   2/18/25    Setting 2  12, 11, 13 33, 32, 30    Average  12 32    Key  7 7    3 Pt 5  9.5          Intake Outcome Measure for FOTO Initial Evaluation Survey     Therapist reviewed FOTO scores for Morelia Swan on 1/29/2025.   FOTO documents entered into [employer] - see Media section.     Intake Score: 27%           Treatment     Supervised modalities after being cleared for contraindications: Paraffin applied to right hand for 10 minutes for increased blood flow and circulation, increased tissue extensibility, and pain management.       Morelia received the following manual therapy techniques for 15 minutes:   - STM to scar, IASTM to forearm, wrist and hand  - PROM wrist flex and ext, isolated and composite       Morelia performed therapeutic exercises for 17 minutes including:  - Reassessment objectives  - AROM wrist flex/ext, UD/RD with dowel (10 ea)  - AROM tendon glides,  "thumb IP flex, abduction, pinky slides, lifts (10 ea) - NT  - Yellow flexbar twists, pro/sup (2 x 10 ea)  - Prayer stretch (2 x 30 seconds)  - NT       Morelia participated in dynamic functional therapeutic activities to improve functional performance for 8 minutes, including:  - In hand manipulation small poms (2 sets)  - in hand manipulation coins (1 set) - NT  - Pink putty dowel presses (2 min)  - Juxtacisor 2"     Home Exercises and Education Provided     Education provided:    - Progress towards goals     Written Home Exercises Provided: Patient instructed to cont prior HEP.  Exercises were reviewed and Morelia was able to demonstrate them prior to the end of the session.  Morelia demonstrated good  understanding of the HEP provided.   .   See EMR under Patient Instructions for exercises provided prior visit.        Assessment     Ms. Thibodeaux tolerated treatment all tasks well; demonstrates improvement in digit flexion and wrist extension, remains limited with flexion.  and pinch assessed today and weak compared to unaffected side as expected at this time. Instructed in weaning from orthosis for light/mod tasks at home and progress with wrist stretching as tolerated.     Morelia is progressing well towards her goals and there are no updates to goals at this time. Pt prognosis is Good. Pt will continue to benefit from skilled outpatient occupational therapy to address the deficits listed in the problem list on initial evaluation provide pt/family education and to maximize pt's level of independence in the home and community environment.     Pt's spiritual, cultural and educational needs considered and pt agreeable to plan of care and goals.    The following goals were discussed with the patient and patient is in agreement with them as to be addressed in the treatment plan.      Long Term Goals (to be met by discharge):  Date Goal Met:       1.) Morelia Swan will demonstrate significantly improved functional " performance from re-assessment as measured by a FOTO Intake score of more than 47.     Goal Status:   In progress     2.) Morelia Swan will return to near to prior level of function for ADLs and household management reporting independence or modified independence.     Goal Status:   In progress     3. Morelia Swan will report pain 2 out of 10 at worst to increase functional use of affected hand for work and leisure tasks.     Goal Status:   In progress      Short Term Goals (to be met by 3/1/25):  Date Goal Met:       Morelia Swan will be independent with home exercise program with written instructions.     Goal Status:   In progress     Morelia Swan will demonstrate 240 degrees of HDEZ of MF to improve functional performance in ADLs/work/leisure tasks.     Goal Status:   In progress     Morelia Swan will demonstrate 50/50 degrees of wrist ext/flex to improve functional performance in ADLs/work/leisure tasks.     Goal Status:   In progress - met for extension     Morelia Swan will demonstrate within 20 lbs of  strength compared to unaffected hand to improve functional grasp for ADLs/work/leisure tasks.     Goal Status:   In progress    2/18/25 Morelia Swan will demonstrate the ability to complete ADL/IADL tasks with 4/10 pain.     Goal Status: Met     Morelia Swan will be able to resume significantly greater occupational roles independently or modified as demonstrated by a FOTO Intake score of more than 37.     Goal Status:   In progress        Plan   Continue skilled occupational therapy with individualized plan of care focusing on increasing functional independence and participation in meaningful daily activities.    Updates/Grading for next session: progress as tolerated per protocol       Priscilla Foster OT

## 2025-02-19 ENCOUNTER — TELEPHONE (OUTPATIENT)
Dept: ORTHOPEDICS | Facility: CLINIC | Age: 85
End: 2025-02-19
Payer: MEDICARE

## 2025-02-20 ENCOUNTER — CLINICAL SUPPORT (OUTPATIENT)
Dept: REHABILITATION | Facility: OTHER | Age: 85
End: 2025-02-20
Payer: MEDICARE

## 2025-02-20 DIAGNOSIS — M25.531 RIGHT WRIST PAIN: Primary | ICD-10-CM

## 2025-02-20 DIAGNOSIS — M25.631 DECREASED RANGE OF MOTION OF RIGHT WRIST: ICD-10-CM

## 2025-02-20 PROCEDURE — 97018 PARAFFIN BATH THERAPY: CPT | Mod: PN,CO

## 2025-02-20 PROCEDURE — 97140 MANUAL THERAPY 1/> REGIONS: CPT | Mod: PN,CO

## 2025-02-20 PROCEDURE — 97110 THERAPEUTIC EXERCISES: CPT | Mod: PN,CO

## 2025-02-20 PROCEDURE — 97530 THERAPEUTIC ACTIVITIES: CPT | Mod: PN,CO

## 2025-02-20 NOTE — PROGRESS NOTES
Occupational Therapy Daily Treatment Note     Date: 2/20/2025  Name: Morelia Swan  Clinic Number: [id number]    Therapy Diagnosis:   1. Right wrist pain        2. Decreased range of motion of right wrist              Medical Diagnosis: Z98.890 (ICD-10-CM) - Post-operative state S52.509D (ICD-10-CM) - Closed fracture of distal end of radius with routine healing, unspecified fracture morphology, unspecified laterality, subsequent encounter     Referring Physician: Ronny Hernandez PA-C  Physician Orders: Eval and Treat  Note:   Right Distal Radius  DOS: 1/6/25  Date of Return to MD: 2/21/25     Date of Injury: 12/31/24  Date of Surgery: 1/6/25  Right ORIF DRF     Evaluation Date: 1/29/2025  Authorization Period: 1/24/25 - 1/24/26  Plan of Care Expiration: 4/23/25  Visit #/ Visits Authorized: 6 of 10  FOTO Completion: Initial eval (1/29/2025)  FOTO #2:  FOTO #3:     Time In: 10:00 am  Time Out: 10:42 am  Total Billable Time: 42 min     Precautions: Standard, strengthening/weightbearing      Subjective     History of Current Condition: Morelia Swan is a 84 y.o. year old Right hand dominant female who is s/p right Distal Radius ORIF by Dr. Bill on 1/6/25. She reports she fell onto her right wrist on 12/31/24 and went to urgent care the next day. X-ray revealed DRF requiring surgical management. She presents today with prefab wrist brace. Morelia Swan is referred to Occupational Therapy for evaluation and treatment. Patient presents today alone.     Pt reports: no pain, wearing splint at all times, feels most stiff in the mornings. Min soreness from exercises last session.     Morelia Swan was compliant with home exercise program given last session.   Response to previous treatment: tolerated well, noted pain relief   Functional change: able to brush teeth, able to flush the toilet     Pain: 0/10  Location: right hand     Objective     Observation/Appearance:   Moderate swelling throughout wrist and hand  Fully closed  incision along volar forearm, thick with fair mobility     Sensation: Patient denies numbness/tingling        Edema:  (Measured circumferential, in centimeters)  Hand/Wrist: Right  1/29/2025 Right  2/18/25 Left  1/29/2025   Wrist Crease 17.5 cm 16.8 15.5 cm   DPC 18.3 cm 18.3 18.0 cm         ELBOW, WRIST RANGE OF MOTION:   Measured in degrees of active motion with goniometer    Right  1/29/2025 Right  2/18/25 Left  1/29/2025   Elbow Extension/Flexion WNL  WNL   Pronation/Supination 80/70 80/80 80/80   Wrist Extension/Flexion 35/30 50/35 75/50   Ulnar/Radial Deviation 25/15 25/15 35/25      Digit AROM:  Measured in degrees of active motion with goniometer and/or distance measured from finger tip to the distal palmar crease (DPC)    Right  1/29/2025 Right  2/18/25 Left  1/29/2025            Long:  MP 0/60 66 0/90              PIP 0/90 90 0/110              DIP 0/63 67 0/70              HDEZ 213 223 270            Thumb: MP                     IP            Rad ADD/ABD            Pal ADD/ABD            Opposition MP crease of SF DPC DPC         STRENGTH: (Measured in pounds using a Dynamometer and pinch meter)    Right  2/18/25  Left   2/18/25    Setting 2  12, 11, 13 33, 32, 30    Average  12 32    Key  7 7    3 Pt 5  9.5          Intake Outcome Measure for FOTO Initial Evaluation Survey     Therapist reviewed FOTO scores for Morelia Swan on 1/29/2025.   FOTO documents entered into [employer] - see Media section.     Intake Score: 27%           Treatment     Supervised modalities after being cleared for contraindications: Paraffin applied to right hand for 10 minutes for increased blood flow and circulation, increased tissue extensibility, and pain management.       Morelia received the following manual therapy techniques for 10 minutes:   - STM to scar, IASTM to forearm, wrist and hand  - PROM wrist flex and ext, isolated and composite       Morelia performed therapeutic exercises for 8 minutes including:  - AROM wrist  "flex/ext, UD/RD with dowel (2 x 10 ea)  - AROM tendon glides, thumb IP flex, abduction, pinky slides, lifts (10 ea)   - Yellow flexbar twists, pro/sup (2 x 10 ea)  - Prayer stretch (2 x 30 seconds)  - NT       Morelia participated in dynamic functional therapeutic activities to improve functional performance for 14 minutes, including:  - In hand manipulation small poms (1 sets)  - in hand manipulation coins (1 set) - NT  - Pink putty dowel presses (2 min)  - Juxtacisor 2" - NT   - Yellow putty forming into ball x 2 min   - Yellow clothes pin pom pom  1 set, opposition  1 set; pain noted in RF     Home Exercises and Education Provided     Education provided:    - Progress towards goals     Written Home Exercises Provided: Patient instructed to cont prior HEP.  Exercises were reviewed and Morelia was able to demonstrate them prior to the end of the session.  Morelia demonstrated good  understanding of the HEP provided.   .   See EMR under Patient Instructions for exercises provided prior visit.        Assessment     Ms. Thibodeaux tolerated progression of strengthening well; instructed to perform tasks pain free. Continues to see progress with ROM although remains limited with flexion. Reports she has been weaning from orthosis for light/mod tasks at home. Plan to progress as tolerated.     Morelia is progressing well towards her goals and there are no updates to goals at this time. Pt prognosis is Good. Pt will continue to benefit from skilled outpatient occupational therapy to address the deficits listed in the problem list on initial evaluation provide pt/family education and to maximize pt's level of independence in the home and community environment.     Pt's spiritual, cultural and educational needs considered and pt agreeable to plan of care and goals.    The following goals were discussed with the patient and patient is in agreement with them as to be addressed in the treatment plan.      Long Term Goals (to " be met by discharge):  Date Goal Met:       1.) Morelia Swan will demonstrate significantly improved functional performance from re-assessment as measured by a FOTO Intake score of more than 47.     Goal Status:   In progress     2.) Morelia Swan will return to near to prior level of function for ADLs and household management reporting independence or modified independence.     Goal Status:   In progress     3. Morelia Swan will report pain 2 out of 10 at worst to increase functional use of affected hand for work and leisure tasks.     Goal Status:   In progress      Short Term Goals (to be met by 3/1/25):  Date Goal Met:       Morelia Swan will be independent with home exercise program with written instructions.     Goal Status:   In progress     Morelia Swan will demonstrate 240 degrees of HDEZ of MF to improve functional performance in ADLs/work/leisure tasks.     Goal Status:   In progress     Morelia Swan will demonstrate 50/50 degrees of wrist ext/flex to improve functional performance in ADLs/work/leisure tasks.     Goal Status:   In progress - met for extension     Morelia Swan will demonstrate within 20 lbs of  strength compared to unaffected hand to improve functional grasp for ADLs/work/leisure tasks.     Goal Status:   In progress    2/18/25 Morelia Swan will demonstrate the ability to complete ADL/IADL tasks with 4/10 pain.     Goal Status: Met     Morelia Swan will be able to resume significantly greater occupational roles independently or modified as demonstrated by a FOTO Intake score of more than 37.     Goal Status:   In progress        Plan   Continue skilled occupational therapy with individualized plan of care focusing on increasing functional independence and participation in meaningful daily activities.    Updates/Grading for next session: progress as tolerated per protocol       TJ Gonsalez

## 2025-02-21 ENCOUNTER — HOSPITAL ENCOUNTER (OUTPATIENT)
Dept: RADIOLOGY | Facility: OTHER | Age: 85
Discharge: HOME | End: 2025-02-21
Attending: SPECIALIST/TECHNOLOGIST
Payer: MEDICARE

## 2025-02-21 ENCOUNTER — OFFICE VISIT (OUTPATIENT)
Dept: ORTHOPEDICS | Facility: CLINIC | Age: 85
End: 2025-02-21
Payer: MEDICARE

## 2025-02-21 DIAGNOSIS — Z98.890 POST-OPERATIVE STATE: Primary | ICD-10-CM

## 2025-02-21 DIAGNOSIS — R52 PAIN: ICD-10-CM

## 2025-02-21 DIAGNOSIS — S52.509D CLOSED FRACTURE OF DISTAL END OF RADIUS WITH ROUTINE HEALING, UNSPECIFIED FRACTURE MORPHOLOGY, UNSPECIFIED LATERALITY, SUBSEQUENT ENCOUNTER: ICD-10-CM

## 2025-02-21 PROCEDURE — 99999 PR PBB SHADOW E&M-EST. PATIENT-LVL II: CPT | Mod: PBBFAC,,, | Performed by: SPECIALIST/TECHNOLOGIST

## 2025-02-21 PROCEDURE — 73110 X-RAY EXAM OF WRIST: CPT | Mod: TC,FY,RT

## 2025-02-21 PROCEDURE — 73110 X-RAY EXAM OF WRIST: CPT | Mod: 26,RT,, | Performed by: RADIOLOGY

## 2025-02-21 NOTE — PROGRESS NOTES
2/21/25  Patient reports 6 weeks status post right distal radius ORIF.  She reports she is in minimal pain.  She had been attending therapy regularly.  She has began to wean from her prefabricated wrist brace.  She denies any numbness or tingling.    1/24/25  Ms. Swan is here today for a post-operative visit.  She is 2 weeks status post right Distal Radius ORIF by Dr. Bill on 1/6/25. She reports that she is moderate pain.  She denies fever, chills, and sweats since the time of the surgery.     Physical exam:    Vitals:    02/21/25 0846   PainSc: 0-No pain     Vital signs are stable, patient is afebrile.  Patient is well dressed and well groomed, no acute distress.  Alert and oriented to person, place, and time.  Incision is clean, dry and intact.  There is no erythema or exudate.  There is no sign of any infection. She is NVI.  Able to make a composite fist.    Assessment: status post right Distal Radius ORIF    Plan:  Morelia was seen today for post-op evaluation.    Diagnoses and all orders for this visit:    Post-operative state    Pain  -     X-Ray Wrist Complete Right; Future    Closed fracture of distal end of radius with routine healing, unspecified fracture morphology, unspecified laterality, subsequent encounter    Patient doing well postoperatively   Patient can begin progressing strengthening and continued progress range of motion   Patient is cleared to return back to work with restrictions of no lifting pushing or pulling greater than 5 lb   Patient we will follow up in 6 weeks with repeat right wrist x-ray.

## 2025-02-25 ENCOUNTER — CLINICAL SUPPORT (OUTPATIENT)
Dept: REHABILITATION | Facility: OTHER | Age: 85
End: 2025-02-25
Payer: MEDICARE

## 2025-02-25 DIAGNOSIS — M25.631 DECREASED RANGE OF MOTION OF RIGHT WRIST: ICD-10-CM

## 2025-02-25 DIAGNOSIS — M25.531 RIGHT WRIST PAIN: Primary | ICD-10-CM

## 2025-02-25 PROCEDURE — 97530 THERAPEUTIC ACTIVITIES: CPT | Mod: PN

## 2025-02-25 PROCEDURE — 97018 PARAFFIN BATH THERAPY: CPT | Mod: PN

## 2025-02-25 PROCEDURE — 97110 THERAPEUTIC EXERCISES: CPT | Mod: PN

## 2025-02-25 NOTE — PROGRESS NOTES
Occupational Therapy Daily Treatment Note     Date: 2/25/2025  Name: Morelia Swan  Clinic Number: [id number]    Therapy Diagnosis:   1. Right wrist pain        2. Decreased range of motion of right wrist          Medical Diagnosis: Z98.890 (ICD-10-CM) - Post-operative state S52.509D (ICD-10-CM) - Closed fracture of distal end of radius with routine healing, unspecified fracture morphology, unspecified laterality, subsequent encounter     Referring Physician: Ronny Hernandez PA-C  Physician Orders: Eval and Treat  Note:   Right Distal Radius  DOS: 1/6/25  Date of Return to MD: 2/21/25     Date of Injury: 12/31/24  Date of Surgery: 1/6/25  Right ORIF DRF     Evaluation Date: 1/29/2025  Authorization Period: 1/24/25 - 1/24/26  Plan of Care Expiration: 4/23/25  Visit #/ Visits Authorized: 7 of 10  FOTO Completion: Initial eval (1/29/2025)  FOTO #2:  FOTO #3:     Time In: 10:10 am  Time Out: 10:55 am  Total Billable Time: 45 min     Precautions: Standard, strengthening/weightbearing      Subjective     History of Current Condition: Morelia Swan is a 84 y.o. year old Right hand dominant female who is s/p right Distal Radius ORIF by Dr. Bill on 1/6/25. She reports she fell onto her right wrist on 12/31/24 and went to urgent care the next day. X-ray revealed DRF requiring surgical management. She presents today with prefab wrist brace. Morelia Swan is referred to Occupational Therapy for evaluation and treatment. Patient presents today alone.     Pt reports: only wearing splint in public since last MD visit, feels minimal pain    Morelia Swan was compliant with home exercise program given last session.   Response to previous treatment: tolerated well, noted pain relief   Functional change: able to brush teeth, able to flush the toilet     Pain: 0/10  Location: right hand     Objective     Observation/Appearance:   Moderate swelling throughout wrist and hand  Fully closed incision along volar forearm, thick with fair  mobility     Sensation: Patient denies numbness/tingling        Edema:  (Measured circumferential, in centimeters)  Hand/Wrist: Right  1/29/2025 Right  2/18/25 Left  1/29/2025   Wrist Crease 17.5 cm 16.8 15.5 cm   DPC 18.3 cm 18.3 18.0 cm         ELBOW, WRIST RANGE OF MOTION:   Measured in degrees of active motion with goniometer    Right  1/29/2025 Right  2/18/25 Left  1/29/2025   Elbow Extension/Flexion WNL  WNL   Pronation/Supination 80/70 80/80 80/80   Wrist Extension/Flexion 35/30 50/35 75/50   Ulnar/Radial Deviation 25/15 25/15 35/25      Digit AROM:  Measured in degrees of active motion with goniometer and/or distance measured from finger tip to the distal palmar crease (DPC)    Right  1/29/2025 Right  2/18/25 Left  1/29/2025            Long:  MP 0/60 66 0/90              PIP 0/90 90 0/110              DIP 0/63 67 0/70              HDEZ 213 223 270            Thumb: MP                     IP            Rad ADD/ABD            Pal ADD/ABD            Opposition MP crease of SF DPC DPC         STRENGTH: (Measured in pounds using a Dynamometer and pinch meter)    Right  2/18/25  Left   2/18/25    Setting 2  12, 11, 13 33, 32, 30    Average  12 32    Key  7 7    3 Pt 5  9.5          Intake Outcome Measure for FOTO Initial Evaluation Survey     Therapist reviewed FOTO scores for Morelia Swan on 1/29/2025.   FOTO documents entered into [employer] - see Media section.     Intake Score: 27%           Treatment     Supervised modalities after being cleared for contraindications: Paraffin applied to right hand for 10 minutes for increased blood flow and circulation, increased tissue extensibility, and pain management.       Morelia received the following manual therapy techniques for 8 minutes:   - STM to scar, IASTM to forearm, wrist and hand  - PROM wrist flex and ext, isolated and composite       Morelia performed therapeutic exercises for 16 minutes including:  - AROM wrist flex/ext, UD/RD with dowel (10 ea)  - AROM  tendon glides, thumb IP flex, abduction, pinky slides, lifts (10 ea) - NT  - Red flexbar twists, pro/sup (2 x 10 ea)  - Pink putty , 3 pt pinches, adduction, lumbricals (20 ea) - added to HEP  - Prayer stretch (2 x 30 seconds)  - NT       Morelia participated in dynamic functional therapeutic activities to improve functional performance for 11 minutes, including:  - Pink putty dowel presses (2 min)  - Red clothes pin 3 pt pinch pom pom  (1 set)  - Gripper black 1st spring foam  (1 set)    Home Exercises and Education Provided     Education provided:    - Progress towards goals     Written Home Exercises Provided: yes.  Exercises were reviewed and Morelia was able to demonstrate them prior to the end of the session.  Morelia demonstrated good  understanding of the HEP provided.   .   See EMR under Patient Instructions for exercises provided prior visit.        Assessment     Ms. Thibodeaux tolerated progression of strengthening well; Plan to progress as tolerated.     Morelia is progressing well towards her goals and there are no updates to goals at this time. Pt prognosis is Good. Pt will continue to benefit from skilled outpatient occupational therapy to address the deficits listed in the problem list on initial evaluation provide pt/family education and to maximize pt's level of independence in the home and community environment.     Pt's spiritual, cultural and educational needs considered and pt agreeable to plan of care and goals.    The following goals were discussed with the patient and patient is in agreement with them as to be addressed in the treatment plan.      Long Term Goals (to be met by discharge):  Date Goal Met:       1.) Morelia Swan will demonstrate significantly improved functional performance from re-assessment as measured by a FOTO Intake score of more than 47.     Goal Status:   In progress     2.) Morelia Swan will return to near to prior level of function for ADLs and household  management reporting independence or modified independence.     Goal Status:   In progress     3. Morelia Swan will report pain 2 out of 10 at worst to increase functional use of affected hand for work and leisure tasks.     Goal Status:   In progress      Short Term Goals (to be met by 3/1/25):  Date Goal Met:       Morelia Swan will be independent with home exercise program with written instructions.     Goal Status:   In progress     Morelia Swan will demonstrate 240 degrees of HDEZ of MF to improve functional performance in ADLs/work/leisure tasks.     Goal Status:   In progress     Morelia Swan will demonstrate 50/50 degrees of wrist ext/flex to improve functional performance in ADLs/work/leisure tasks.     Goal Status:   In progress - met for extension     Morelia Swan will demonstrate within 20 lbs of  strength compared to unaffected hand to improve functional grasp for ADLs/work/leisure tasks.     Goal Status:   In progress    2/18/25 Morelia Swan will demonstrate the ability to complete ADL/IADL tasks with 4/10 pain.     Goal Status: Met     Morelia Swan will be able to resume significantly greater occupational roles independently or modified as demonstrated by a FOTO Intake score of more than 37.     Goal Status:   In progress        Plan   Continue skilled occupational therapy with individualized plan of care focusing on increasing functional independence and participation in meaningful daily activities.    Updates/Grading for next session: progress as tolerated per protocol       Priscilla Foster OT

## 2025-02-25 NOTE — PATIENT INSTRUCTIONS
"[facility]  HOME EXERCISE PROGRAM     Complete the following strengthening program 1x/day.        /Squeezes  - Squeeze putty, gripping for a max of 3 - 5 min          Resisted Lumbrical   - Bending only at large knuckles, press putty down against thumb. Keep fingertips straight.   -  10 times        Three Point Pinches  - Roll putty into a log  - Pinch along with the thumb, index and middle fingers.  - Make sure to keep an "O"  - Pinch 20 times         Adduction  - Hand should be palm down, flat on the table  - Place a chunk of putty between each finger  - Squeeze the fingers together, into the putty  - Perform 20 reps    Priscilla Foster, OTR/L, CHT       "

## 2025-02-27 ENCOUNTER — CLINICAL SUPPORT (OUTPATIENT)
Dept: REHABILITATION | Facility: OTHER | Age: 85
End: 2025-02-27
Payer: MEDICARE

## 2025-02-27 DIAGNOSIS — M25.531 RIGHT WRIST PAIN: Primary | ICD-10-CM

## 2025-02-27 DIAGNOSIS — M25.631 DECREASED RANGE OF MOTION OF RIGHT WRIST: ICD-10-CM

## 2025-02-27 PROCEDURE — 97110 THERAPEUTIC EXERCISES: CPT | Mod: PN

## 2025-02-27 PROCEDURE — 97530 THERAPEUTIC ACTIVITIES: CPT | Mod: PN

## 2025-02-27 PROCEDURE — 97018 PARAFFIN BATH THERAPY: CPT | Mod: PN

## 2025-02-27 NOTE — PROGRESS NOTES
Occupational Therapy Daily Treatment Note     Date: 2/27/2025  Name: Morelia Swan  Clinic Number: [id number]    Therapy Diagnosis:   1. Right wrist pain        2. Decreased range of motion of right wrist          Medical Diagnosis: Z98.890 (ICD-10-CM) - Post-operative state S52.509D (ICD-10-CM) - Closed fracture of distal end of radius with routine healing, unspecified fracture morphology, unspecified laterality, subsequent encounter     Referring Physician: Ronny Hernandez PA-C  Physician Orders: Eval and Treat  Note:   Right Distal Radius  DOS: 1/6/25  Date of Return to MD: 2/21/25     Date of Injury: 12/31/24  Date of Surgery: 1/6/25  Right ORIF DRF     Evaluation Date: 1/29/2025  Authorization Period: 1/24/25 - 1/24/26  Plan of Care Expiration: 4/23/25  Visit #/ Visits Authorized: 8 of 10  FOTO Completion: Initial eval (1/29/2025)  FOTO #2:  FOTO #3:     Time In: 10:00 am  Time Out: 10:38 am  Total Billable Time: 38 min     Precautions: Standard, strengthening/weightbearing      Subjective     History of Current Condition: Morelia Swan is a 84 y.o. year old Right hand dominant female who is s/p right Distal Radius ORIF by Dr. Bill on 1/6/25. She reports she fell onto her right wrist on 12/31/24 and went to urgent care the next day. X-ray revealed DRF requiring surgical management. She presents today with prefab wrist brace. Morelia Swan is referred to Occupational Therapy for evaluation and treatment. Patient presents today alone.     Pt reports: feelings of weakness, no soreness following last session    Morelia Swan was compliant with home exercise program given last session.   Response to previous treatment: tolerated well, noted pain relief   Functional change: able to brush teeth, able to flush the toilet     Pain: 0/10  Location: right hand     Objective     Observation/Appearance:   Moderate swelling throughout wrist and hand  Fully closed incision along volar forearm, thick with fair mobility      Sensation: Patient denies numbness/tingling        Edema:  (Measured circumferential, in centimeters)  Hand/Wrist: Right  1/29/2025 Right  2/18/25 Left  1/29/2025   Wrist Crease 17.5 cm 16.8 15.5 cm   DPC 18.3 cm 18.3 18.0 cm         ELBOW, WRIST RANGE OF MOTION:   Measured in degrees of active motion with goniometer    Right  1/29/2025 Right  2/18/25 Left  1/29/2025   Elbow Extension/Flexion WNL  WNL   Pronation/Supination 80/70 80/80 80/80   Wrist Extension/Flexion 35/30 50/35 75/50   Ulnar/Radial Deviation 25/15 25/15 35/25      Digit AROM:  Measured in degrees of active motion with goniometer and/or distance measured from finger tip to the distal palmar crease (DPC)    Right  1/29/2025 Right  2/18/25 Left  1/29/2025            Long:  MP 0/60 66 0/90              PIP 0/90 90 0/110              DIP 0/63 67 0/70              HDEZ 213 223 270            Thumb: MP                     IP            Rad ADD/ABD            Pal ADD/ABD            Opposition MP crease of SF DPC DPC         STRENGTH: (Measured in pounds using a Dynamometer and pinch meter)    Right  2/18/25  Left   2/18/25    Setting 2  12, 11, 13 33, 32, 30    Average  12 32    Key  7 7    3 Pt 5  9.5          Intake Outcome Measure for FOTO Initial Evaluation Survey     Therapist reviewed FOTO scores for Morelia Swan on 1/29/2025.   FOTO documents entered into [employer] - see Media section.     Intake Score: 27%           Treatment     Supervised modalities after being cleared for contraindications: Paraffin applied to right hand for 10 minutes for increased blood flow and circulation, increased tissue extensibility, and pain management.       Morelia received the following manual therapy techniques for 00 minutes:   - STM to scar, IASTM to forearm, wrist and hand  - PROM wrist flex and ext, isolated and composite       Morelia performed therapeutic exercises for 12 minutes including:  - AROM wrist flex/ext, UD/RD with dowel (10 ea)  - Pink putty ,  3 pt pinches, adduction, lumbricals (1 min ea)  - Red flexbar twists, pro/sup (3 x 10 ea)  - Prayer stretch (2 x 30 seconds)  - NT       Morelia participated in dynamic functional therapeutic activities to improve functional performance for 16 minutes, including:  - Pink putty dowel presses (2 min)  - Green clothes pin 3 pt pinch pom pom  (1 set)  - Gripper black 1st spring foam  (1 set)  - Flexbar oscillations (2 x 1 min)    Home Exercises and Education Provided     Education provided:    - Progress towards goals     Written Home Exercises Provided: Patient instructed to cont prior HEP.  Exercises were reviewed and Morelia was able to demonstrate them prior to the end of the session.  Morelia demonstrated good  understanding of the HEP provided.   .   See EMR under Patient Instructions for exercises provided prior visit.        Assessment     Ms. Thibodeaux tolerated progression of strengthening well with fatigue by end of session; Plan to progress as tolerated.     Morelia is progressing well towards her goals and there are no updates to goals at this time. Pt prognosis is Good. Pt will continue to benefit from skilled outpatient occupational therapy to address the deficits listed in the problem list on initial evaluation provide pt/family education and to maximize pt's level of independence in the home and community environment.     Pt's spiritual, cultural and educational needs considered and pt agreeable to plan of care and goals.    The following goals were discussed with the patient and patient is in agreement with them as to be addressed in the treatment plan.      Long Term Goals (to be met by discharge):  Date Goal Met:       1.) Morelia Swan will demonstrate significantly improved functional performance from re-assessment as measured by a FOTO Intake score of more than 47.     Goal Status:   In progress     2.) Morelia Swan will return to near to prior level of function for ADLs and household management  reporting independence or modified independence.     Goal Status:   In progress     3. Morelia Swan will report pain 2 out of 10 at worst to increase functional use of affected hand for work and leisure tasks.     Goal Status:   In progress      Short Term Goals (to be met by 3/1/25):  Date Goal Met:       Morelia Swan will be independent with home exercise program with written instructions.     Goal Status:   In progress     Morelia Swan will demonstrate 240 degrees of HDEZ of MF to improve functional performance in ADLs/work/leisure tasks.     Goal Status:   In progress     Morelia Swan will demonstrate 50/50 degrees of wrist ext/flex to improve functional performance in ADLs/work/leisure tasks.     Goal Status:   In progress - met for extension     Morelia Swan will demonstrate within 20 lbs of  strength compared to unaffected hand to improve functional grasp for ADLs/work/leisure tasks.     Goal Status:   In progress    2/18/25 Morelia Swan will demonstrate the ability to complete ADL/IADL tasks with 4/10 pain.     Goal Status: Met     Morelia Swan will be able to resume significantly greater occupational roles independently or modified as demonstrated by a FOTO Intake score of more than 37.     Goal Status:   In progress        Plan   Continue skilled occupational therapy with individualized plan of care focusing on increasing functional independence and participation in meaningful daily activities.    Updates/Grading for next session: progress as tolerated per protocol       Pricsilla Foster OT

## 2025-03-06 ENCOUNTER — CLINICAL SUPPORT (OUTPATIENT)
Dept: REHABILITATION | Facility: OTHER | Age: 85
End: 2025-03-06
Payer: MEDICARE

## 2025-03-06 DIAGNOSIS — M25.631 DECREASED RANGE OF MOTION OF RIGHT WRIST: ICD-10-CM

## 2025-03-06 DIAGNOSIS — M25.531 RIGHT WRIST PAIN: Primary | ICD-10-CM

## 2025-03-06 PROCEDURE — 97018 PARAFFIN BATH THERAPY: CPT | Mod: PN

## 2025-03-06 PROCEDURE — 97530 THERAPEUTIC ACTIVITIES: CPT | Mod: PN

## 2025-03-06 PROCEDURE — 97110 THERAPEUTIC EXERCISES: CPT | Mod: PN

## 2025-03-06 NOTE — PROGRESS NOTES
Occupational Therapy Daily Treatment Note     Date: 3/6/2025  Name: Morelia Swan  Clinic Number: [id number]    Therapy Diagnosis:   1. Right wrist pain        2. Decreased range of motion of right wrist          Medical Diagnosis: Z98.890 (ICD-10-CM) - Post-operative state S52.509D (ICD-10-CM) - Closed fracture of distal end of radius with routine healing, unspecified fracture morphology, unspecified laterality, subsequent encounter     Referring Physician: Ronny Hernandez PA-C  Physician Orders: Eval and Treat  Note:   Right Distal Radius  DOS: 1/6/25  Date of Return to MD: 2/21/25     Date of Injury: 12/31/24  Date of Surgery: 1/6/25  Right ORIF DRF     Evaluation Date: 1/29/2025  Authorization Period: 1/24/25 - 1/24/26  Plan of Care Expiration: 4/23/25  Visit #/ Visits Authorized: 9 of 10  FOTO Completion: Initial eval (1/29/2025)  FOTO #2:  FOTO #3:     Time In: 10:19 am  Time Out: 11:00 am  Total Billable Time: 41 min     Precautions: Standard, strengthening/weightbearing      Subjective     History of Current Condition: Morelia Swan is a 84 y.o. year old Right hand dominant female who is s/p right Distal Radius ORIF by Dr. Bill on 1/6/25. She reports she fell onto her right wrist on 12/31/24 and went to urgent care the next day. X-ray revealed DRF requiring surgical management. She presents today with prefab wrist brace. Morelia Swan is referred to Occupational Therapy for evaluation and treatment. Patient presents today alone.     Pt reports: doing more house hold chores but still not back to PLOF    Morelia Swan was compliant with home exercise program given last session.   Response to previous treatment: tolerated well, noted pain relief   Functional change: able to brush teeth, able to flush the toilet     Pain: 0/10  Location: right hand     Objective     Observation/Appearance:   Moderate swelling throughout wrist and hand  Fully closed incision along volar forearm, thick with fair mobility      Sensation: Patient denies numbness/tingling        Edema:  (Measured circumferential, in centimeters)  Hand/Wrist: Right  1/29/2025 Right  2/18/25 Right  3/6/25 Left  1/29/2025   Wrist Crease 17.5 cm 16.8 16.4 15.5 cm   DPC 18.3 cm 18.3  18.0 cm         ELBOW, WRIST RANGE OF MOTION:   Measured in degrees of active motion with goniometer    Right  1/29/2025 Right  2/18/25 Right  3/6/25 Left  1/29/2025   Elbow Extension/Flexion WNL   WNL   Pronation/Supination 80/70 80/80 WNL 80/80   Wrist Extension/Flexion 35/30 50/35 50/40 75/50   Ulnar/Radial Deviation 25/15 25/15 25/15 35/25      Digit AROM:  Measured in degrees of active motion with goniometer and/or distance measured from finger tip to the distal palmar crease (DPC)    Right  1/29/2025 Right  2/18/25 Right  3/6/25 Left  1/29/2025             Long:  MP 0/60 66 76 0/90              PIP 0/90 90 95 0/110              DIP 0/63 67 70 0/70              HDEZ 213 223 241 270             Thumb: MP                      IP             Rad ADD/ABD             Pal ADD/ABD             Opposition MP crease of SF DPC  DPC         STRENGTH: (Measured in pounds using a Dynamometer and pinch meter)    Right  2/18/25  Right  3/6/25 Left   2/18/25    Setting 2  12, 11, 13 18, 22, 22 33, 32, 30    Average  12 21 32    Key  7 8.4 7    3 Pt 5  6.8 9.5          Intake Outcome Measure for FOTO Initial Evaluation Survey     Therapist reviewed FOTO scores for Morelia Swan on 1/29/2025.   FOTO documents entered into [facility] - see Media section.     Intake Score: 27%  3/6/25: 55%           Treatment     Supervised modalities after being cleared for contraindications: Paraffin applied to right hand for 8 minutes for increased blood flow and circulation, increased tissue extensibility, and pain management.       Morelia received the following manual therapy techniques for 3 minutes:   - STM to scar, IASTM to forearm, wrist and hand - NT  - PROM wrist flex and ext, isolated and composite        Morelia performed therapeutic exercises for 16 minutes including:  - Reassessment objectives  - AROM wrist flex/ext, UD/RD with dowel (10 ea)  - Wrist curls, 2#, 3 ways (3 x 10 ea)  - Pink putty , 3 pt pinches, adduction, lumbricals (1 min ea) - NT  - Red flexbar twists, pro/sup (3 x 10 ea) - NT  - Prayer stretch (2 x 30 seconds)  - NT       Morelia participated in dynamic functional therapeutic activities to improve functional performance for 14 minutes, including:  - Pink putty dowel presses (2 min)  - Green clothes pin 3 pt pinch foam  (1 set)  - Gripper black 1st spring foam  (1 set)  - Flexbar oscillations (2 x 1 min)    Home Exercises and Education Provided     Education provided:    - Progress towards goals     Written Home Exercises Provided: Patient instructed to cont prior HEP.  Exercises were reviewed and Morelia was able to demonstrate them prior to the end of the session.  Morelia demonstrated good  understanding of the HEP provided.   .   See EMR under Patient Instructions for exercises provided prior visit.        Assessment     Ms. Thibodeaux is progressing well, meeting most goals with exception of ROM and return to prior level of function. She would like to hold off on scheduling more OT appts at this time as her  is currently in the hospital. She will contact us to follow up as needed and progress as tolerated towards goals.    Morelia is progressing well towards her goals and there are no updates to goals at this time. Pt prognosis is Good. Pt will continue to benefit from skilled outpatient occupational therapy to address the deficits listed in the problem list on initial evaluation provide pt/family education and to maximize pt's level of independence in the home and community environment.     Pt's spiritual, cultural and educational needs considered and pt agreeable to plan of care and goals.    The following goals were discussed with the patient and patient is in agreement with  them as to be addressed in the treatment plan.      Long Term Goals (to be met by discharge):  Date Goal Met:      3/6/25 1.) Morelia Swan will demonstrate significantly improved functional performance from re-assessment as measured by a FOTO Intake score of more than 47.     Goal Status:  Met     2.) Morelia Swan will return to near to prior level of function for ADLs and household management reporting independence or modified independence.     Goal Status:   In progress    3/6/25 3. Morelia Swan will report pain 2 out of 10 at worst to increase functional use of affected hand for work and leisure tasks.     Goal Status: Met      Short Term Goals (to be met by 3/1/25):  Date Goal Met:      3/6/25 Morelia Swan will be independent with home exercise program with written instructions.     Goal Status:  Met    3/6/25 Morelia Swan will demonstrate 240 degrees of HDEZ of MF to improve functional performance in ADLs/work/leisure tasks.     Goal Status:  Met     Morelia Swan will demonstrate 50/50 degrees of wrist ext/flex to improve functional performance in ADLs/work/leisure tasks.     Goal Status:   In progress - met for extension    3/6/25 Morelia Swan will demonstrate within 20 lbs of  strength compared to unaffected hand to improve functional grasp for ADLs/work/leisure tasks.     Goal Status:   Met    2/18/25 Morelia Swan will demonstrate the ability to complete ADL/IADL tasks with 4/10 pain.     Goal Status: Met    3/6/25 Morelia Swan will be able to resume significantly greater occupational roles independently or modified as demonstrated by a FOTO Intake score of more than 37.     Goal Status:  Met        Plan   Continue skilled occupational therapy with individualized plan of care focusing on increasing functional independence and participation in meaningful daily activities.    Updates/Grading for next session: progress as tolerated per protocol       Priscilla Foster OT

## 2025-04-04 ENCOUNTER — OFFICE VISIT (OUTPATIENT)
Dept: ORTHOPEDICS | Facility: CLINIC | Age: 85
End: 2025-04-04
Payer: MEDICARE

## 2025-04-04 ENCOUNTER — HOSPITAL ENCOUNTER (OUTPATIENT)
Dept: RADIOLOGY | Facility: OTHER | Age: 85
Discharge: HOME | End: 2025-04-04
Attending: SPECIALIST/TECHNOLOGIST
Payer: MEDICARE

## 2025-04-04 DIAGNOSIS — S52.509D CLOSED FRACTURE OF DISTAL END OF RADIUS WITH ROUTINE HEALING, UNSPECIFIED FRACTURE MORPHOLOGY, UNSPECIFIED LATERALITY, SUBSEQUENT ENCOUNTER: ICD-10-CM

## 2025-04-04 DIAGNOSIS — Z98.890 POST-OPERATIVE STATE: Primary | ICD-10-CM

## 2025-04-04 DIAGNOSIS — R52 PAIN: ICD-10-CM

## 2025-04-04 PROCEDURE — 73110 X-RAY EXAM OF WRIST: CPT | Mod: 26,RT,, | Performed by: STUDENT IN AN ORGANIZED HEALTH CARE EDUCATION/TRAINING PROGRAM

## 2025-04-04 PROCEDURE — 73110 X-RAY EXAM OF WRIST: CPT | Mod: TC,FY,RT

## 2025-04-04 PROCEDURE — 99999 PR PBB SHADOW E&M-EST. PATIENT-LVL II: CPT | Mod: PBBFAC,,, | Performed by: SPECIALIST/TECHNOLOGIST

## 2025-04-04 NOTE — PROGRESS NOTES
4/4/25  Patient reports 12 weeks status post right distal radius ORIF.  She reports she is in no pain.  She has returned back to normal activities with minimal discomfort.  She is very happy with her outcome.    2/21/25  Patient reports 6 weeks status post right distal radius ORIF.  She reports she is in minimal pain.  She had been attending therapy regularly.  She has began to wean from her prefabricated wrist brace.  She denies any numbness or tingling.    1/24/25  Ms. Swan is here today for a post-operative visit.  She is 2 weeks status post right Distal Radius ORIF by Dr. Bill on 1/6/25. She reports that she is moderate pain.  She denies fever, chills, and sweats since the time of the surgery.     Physical exam:    Vitals:    04/04/25 1303   PainSc: 0-No pain     Vital signs are stable, patient is afebrile.  Patient is well dressed and well groomed, no acute distress.  Alert and oriented to person, place, and time.  Incision is clean, dry and intact.  There is no erythema or exudate.  There is no sign of any infection. She is NVI.  Able to make a composite fist.    Assessment: status post right Distal Radius ORIF    Plan:  Morelia was seen today for post-op evaluation.    Diagnoses and all orders for this visit:    Post-operative state    Closed fracture of distal end of radius with routine healing, unspecified fracture morphology, unspecified laterality, subsequent encounter      Patient doing well postoperatively   Patient can begin progressing strengthening and continued progress range of motion   Patient we will follow up in clinic p.r.n.

## (undated) DEVICE — PAD CAST 2 IN X 4YDS STERILE

## (undated) DEVICE — DRILL QUICK RELEASE 2.8MM 5IN

## (undated) DEVICE — DRAPE C-ARM MINI DISP

## (undated) DEVICE — BANDAGE MATRIX HK LOOP 4IN 5YD

## (undated) DEVICE — BIT DRILL QUICK RELEASE 2.8MM

## (undated) DEVICE — SPONGE COTTON TRAY 4X4IN

## (undated) DEVICE — Device

## (undated) DEVICE — TOWEL OR DISP STRL BLUE 4/PK

## (undated) DEVICE — GLOVE BIOGEL PI MICRO INDIC 7

## (undated) DEVICE — SOL POVIDONE SCRUB IODINE 4 OZ

## (undated) DEVICE — GUIDEWIRE ORTHO .054 X 6 IN
Type: IMPLANTABLE DEVICE | Site: WRIST | Status: NON-FUNCTIONAL
Removed: 2025-01-06

## (undated) DEVICE — GLOVE BIOGEL ECLIPSE SZ 7

## (undated) DEVICE — SPLINT PLASTER EXT FAST 4X15

## (undated) DEVICE — CORD FOR BIPOLAR FORCEPS 12

## (undated) DEVICE — BIT DRILL QUICK RELEASE 2.0MM

## (undated) DEVICE — COVER CAMERA OPERATING ROOM

## (undated) DEVICE — SOL IRR SOD CHL .9% POUR

## (undated) DEVICE — TOURNIQUET SB QC DP 18X4IN

## (undated) DEVICE — PAD CAST SPECIALIST STRL 4

## (undated) DEVICE — GOWN ECLIPSE REINF LVL4 TWL XL

## (undated) DEVICE — BLADE SURG #15 CARBON STEEL

## (undated) DEVICE — DRESSING N ADH OIL EMUL 3X3

## (undated) DEVICE — BANDAGE MATRIX HK LOOP 2IN 5YD

## (undated) DEVICE — SLING ARM LARGE FOAM STRAP

## (undated) DEVICE — DRAPE STERI-DRAPE 1000 17X11IN

## (undated) DEVICE — ADHESIVE DERMABOND ADVANCED

## (undated) DEVICE — SUT MONOCRYL 4-0 UD P-3 18

## (undated) DEVICE — SUT VICRYL 4-0 18 P-3